# Patient Record
Sex: MALE | Race: BLACK OR AFRICAN AMERICAN | Employment: UNEMPLOYED | URBAN - METROPOLITAN AREA
[De-identification: names, ages, dates, MRNs, and addresses within clinical notes are randomized per-mention and may not be internally consistent; named-entity substitution may affect disease eponyms.]

---

## 2018-04-28 ENCOUNTER — HOSPITAL ENCOUNTER (EMERGENCY)
Age: 28
Discharge: HOME | End: 2018-04-29
Attending: EMERGENCY MEDICINE
Payer: SELF-PAY

## 2018-04-28 DIAGNOSIS — F41.9 ACUTE ANXIETY: Primary | ICD-10-CM

## 2018-04-28 PROCEDURE — 93005 ELECTROCARDIOGRAM TRACING: CPT | Performed by: EMERGENCY MEDICINE

## 2018-04-28 PROCEDURE — 99284 EMERGENCY DEPT VISIT MOD MDM: CPT | Performed by: EMERGENCY MEDICINE

## 2018-04-28 PROCEDURE — 74011250637 HC RX REV CODE- 250/637: Performed by: EMERGENCY MEDICINE

## 2018-04-28 RX ORDER — LORAZEPAM 1 MG/1
1 TABLET ORAL
Status: COMPLETED | OUTPATIENT
Start: 2018-04-28 | End: 2018-04-28

## 2018-04-28 RX ADMIN — LORAZEPAM 1 MG: 1 TABLET ORAL at 23:56

## 2018-04-29 VITALS
HEIGHT: 69 IN | WEIGHT: 142 LBS | RESPIRATION RATE: 16 BRPM | DIASTOLIC BLOOD PRESSURE: 70 MMHG | SYSTOLIC BLOOD PRESSURE: 110 MMHG | HEART RATE: 79 BPM | BODY MASS INDEX: 21.03 KG/M2 | OXYGEN SATURATION: 100 % | TEMPERATURE: 97.5 F

## 2018-04-29 LAB
ATRIAL RATE: 78 BPM
CALCULATED P AXIS, ECG09: 63 DEGREES
CALCULATED R AXIS, ECG10: 83 DEGREES
CALCULATED T AXIS, ECG11: 30 DEGREES
DIAGNOSIS, 93000: NORMAL
P-R INTERVAL, ECG05: 144 MS
Q-T INTERVAL, ECG07: 358 MS
QRS DURATION, ECG06: 84 MS
QTC CALCULATION (BEZET), ECG08: 408 MS
VENTRICULAR RATE, ECG03: 78 BPM

## 2018-04-29 RX ORDER — HYDROXYZINE PAMOATE 25 MG/1
25 CAPSULE ORAL
Qty: 30 CAP | Refills: 0 | Status: SHIPPED | OUTPATIENT
Start: 2018-04-29 | End: 2018-05-13

## 2018-04-29 NOTE — DISCHARGE INSTRUCTIONS

## 2018-04-29 NOTE — ED PROVIDER NOTES
Patient is a 25 y.o. male presenting with panic. The history is provided by the patient. Panic Attack    This is a new problem. The current episode started less than 1 hour ago. The problem has not changed since onset. Duration of episode(s) is 1 hour. The problem occurs constantly. The pain is associated with stress. The pain is present in the substernal region. The patient is experiencing no pain. The quality of the pain is described as tightness. The pain does not radiate. The symptoms are aggravated by stress. Associated symptoms include malaise/fatigue. Pertinent negatives include no abdominal pain, no back pain, no claudication, no cough, no diaphoresis, no dizziness, no exertional chest pressure, no fever, no headaches, no hemoptysis, no irregular heartbeat, no leg pain, no lower extremity edema, no nausea, no near-syncope, no numbness, no orthopnea, no palpitations, no PND, no shortness of breath, no sputum production, no vomiting and no weakness. He has tried rest for the symptoms. The treatment provided no relief. Risk factors include male gender. His past medical history does not include aneurysm, cancer, DM, DVT, HTN, PE or CHF. Past Medical History:   Diagnosis Date    Seizures ([facility] [address]     multiple seizures in 2011 only    Stroke [facility]) 2004       No past surgical history on file. No family history on file. Social History     Social History    Marital status: SINGLE     Spouse name: N/A    Number of children: N/A    Years of education: N/A     Occupational History    Not on file. Social History Main Topics    Smoking status: Never Smoker    Smokeless tobacco: Never Used    Alcohol use Yes      Comment: occasional    Drug use: No    Sexual activity: Not on file     Other Topics Concern    Not on file     Social History Narrative    No narrative on file         ALLERGIES: Review of patient's allergies indicates no known allergies.     Review of Systems   Constitutional: Positive for malaise/fatigue. Negative for diaphoresis and fever. Respiratory: Negative for cough, hemoptysis, sputum production and shortness of breath. Cardiovascular: Negative for palpitations, orthopnea, claudication, PND and near-syncope. Gastrointestinal: Negative for abdominal pain, nausea and vomiting. Musculoskeletal: Negative for back pain. Neurological: Negative for dizziness, weakness, numbness and headaches. All other systems reviewed and are negative. Vitals:    04/28/18 2301 04/29/18 0017 04/29/18 0024   BP: 128/87     Pulse: (!) 115  78   Resp: 24     Temp: 97.5 °F (36.4 °C)     SpO2: 100% 100%    Weight: 64.4 kg (142 lb)     Height: 5' 9\" (1.753 m)              Physical Exam   Constitutional: He is oriented to person, place, and time. He appears well-developed and well-nourished. He appears distressed. HENT:   Head: Normocephalic and atraumatic. Right Ear: Tympanic membrane and external ear normal.   Left Ear: Tympanic membrane and external ear normal.   Mouth/Throat: Oropharynx is clear and moist.   Eyes: Conjunctivae and EOM are normal. Pupils are equal, round, and reactive to light. Neck: Normal range of motion. Neck supple. No tracheal deviation present. Cardiovascular: Regular rhythm, normal heart sounds and intact distal pulses. Tachycardia present. Exam reveals no gallop and no friction rub. No murmur heard. Pulmonary/Chest: Effort normal and breath sounds normal. No respiratory distress. He has no wheezes. Abdominal: Soft. Bowel sounds are normal. He exhibits no distension and no mass. There is no hepatosplenomegaly. There is no tenderness. There is no rebound and no guarding. Musculoskeletal: Normal range of motion. He exhibits no edema. Lymphadenopathy:     He has no cervical adenopathy. Neurological: He is alert and oriented to person, place, and time. He displays normal reflexes. No cranial nerve deficit. Skin: Skin is warm and dry.  No rash noted. He is not diaphoretic. No erythema. Psychiatric: His speech is normal. His mood appears anxious. Nursing note and vitals reviewed. MDM  Number of Diagnoses or Management Options  Acute anxiety: new and requires workup     Amount and/or Complexity of Data Reviewed  Review and summarize past medical records: yes  Independent visualization of images, tracings, or specimens: yes    Risk of Complications, Morbidity, and/or Mortality  Presenting problems: low  Diagnostic procedures: minimal  Management options: low    Patient Progress  Patient progress: improved        ED Course       Procedures    The patient was observed in the ED. Feeling much improved times discharge. Results Reviewed:    EKG revealed a normal sinus rhythm with no acute ST or T-wave changes. I discussed the results of all labs, procedures, radiographs, and treatments with the patient and available family. Treatment plan is agreed upon and the patient is ready for discharge. All voiced understanding of the discharge plan and medication instructions or changes as appropriate. Questions about treatment in the ED were answered. All were encouraged to return should symptoms worsen or new problems develop.

## 2018-04-29 NOTE — ED NOTES
I have reviewed discharge instructions with the patient. The patient verbalized understanding. Patient left ED via Discharge Method: ambulatory to Home with ride from friendt). Opportunity for questions and clarification provided. Patient given 1 scripts. To continue your aftercare when you leave the hospital, you may receive an automated call from our care team to check in on how you are doing. This is a free service and part of our promise to provide the best care and service to meet your aftercare needs.  If you have questions, or wish to unsubscribe from this service please call [phone]. Thank you for Choosing our [facility] Emergency Department.

## 2018-07-21 ENCOUNTER — HOSPITAL ENCOUNTER (EMERGENCY)
Age: 28
Discharge: HOME | End: 2018-07-21
Attending: EMERGENCY MEDICINE
Payer: SELF-PAY

## 2018-07-21 VITALS
HEART RATE: 116 BPM | WEIGHT: 130 LBS | HEIGHT: 69 IN | OXYGEN SATURATION: 100 % | SYSTOLIC BLOOD PRESSURE: 125 MMHG | BODY MASS INDEX: 19.26 KG/M2 | TEMPERATURE: 98.3 F | RESPIRATION RATE: 18 BRPM | DIASTOLIC BLOOD PRESSURE: 92 MMHG

## 2018-07-21 DIAGNOSIS — F41.9 ACUTE ANXIETY: Primary | ICD-10-CM

## 2018-07-21 DIAGNOSIS — T75.4XXA ELECTRICAL SHOCK OF HAND, INITIAL ENCOUNTER: ICD-10-CM

## 2018-07-21 LAB
ATRIAL RATE: 90 BPM
CALCULATED P AXIS, ECG09: 80 DEGREES
CALCULATED R AXIS, ECG10: 91 DEGREES
CALCULATED T AXIS, ECG11: 5 DEGREES
DIAGNOSIS, 93000: NORMAL
P-R INTERVAL, ECG05: 130 MS
Q-T INTERVAL, ECG07: 326 MS
QRS DURATION, ECG06: 82 MS
QTC CALCULATION (BEZET), ECG08: 398 MS
VENTRICULAR RATE, ECG03: 90 BPM

## 2018-07-21 PROCEDURE — 93005 ELECTROCARDIOGRAM TRACING: CPT | Performed by: EMERGENCY MEDICINE

## 2018-07-21 PROCEDURE — 99283 EMERGENCY DEPT VISIT LOW MDM: CPT | Performed by: EMERGENCY MEDICINE

## 2018-07-21 PROCEDURE — 74011250637 HC RX REV CODE- 250/637: Performed by: EMERGENCY MEDICINE

## 2018-07-21 RX ORDER — CLONAZEPAM 0.5 MG/1
1 TABLET ORAL
Status: COMPLETED | OUTPATIENT
Start: 2018-07-21 | End: 2018-07-21

## 2018-07-21 RX ADMIN — CLONAZEPAM 1 MG: 0.5 TABLET ORAL at 01:39

## 2018-07-21 NOTE — ED NOTES
I have reviewed discharge instructions with the patient. The patient verbalized understanding. Patient left ED via Discharge Method: ambulatory to Home with friend. Opportunity for questions and clarification provided. Patient given 0 scripts. To continue your aftercare when you leave the hospital, you may receive an automated call from our care team to check in on how you are doing. This is a free service and part of our promise to provide the best care and service to meet your aftercare needs.  If you have questions, or wish to unsubscribe from this service please call [phone]. Thank you for Choosing our [facility] Emergency Department.

## 2018-07-21 NOTE — LETTER
ADULT WORK/SCHOOL NOTE [facility] 
[address] [facility] [address] 
 
 
07/21/18 To whom it may concern, Please be advised that [facility] was evaluated and discharged from the Emergency Department on 07/21/18. [facility] is excused from work/school now through 7/22/18 [facility] may return to work with the following restrictions: 
 
         None Should [facility] require more time off or further clearance, he should follow up with his  own regular physician or specialist. 
 
 
Sincerely, Eunice Campos RN

## 2018-07-21 NOTE — ED PROVIDER NOTES
HPI Comments: ppatient was fixing his doorbell and received a shock to both fingers and hands with  Electrical current shooting up into his arms. Patient extremely anxious. Complains of twitching and nervousness. There are no burns or wounds. Patient is a 25 y.o. male presenting with other event. The history is provided by the patient. Other   This is a new problem. The current episode started 1 to 2 hours ago. The problem occurs constantly. The problem has been gradually improving. Pertinent negatives include no chest pain and no shortness of breath. Past Medical History:   Diagnosis Date    Seizures ([address]     multiple seizures in 2011 only    Stroke [facility]) 2004       History reviewed. No pertinent surgical history. History reviewed. No pertinent family history. Social History     Social History    Marital status: SINGLE     Spouse name: N/A    Number of children: N/A    Years of education: N/A     Occupational History    Not on file. Social History Main Topics    Smoking status: Never Smoker    Smokeless tobacco: Never Used    Alcohol use Yes      Comment: occasional    Drug use: No    Sexual activity: Not on file     Other Topics Concern    Not on file     Social History Narrative         ALLERGIES: Review of patient's allergies indicates no known allergies. Review of Systems   Respiratory: Negative for shortness of breath. Cardiovascular: Negative for chest pain and palpitations. Neurological: Positive for tremors. Psychiatric/Behavioral: The patient is nervous/anxious. Vitals:    07/21/18 0116   BP: (!) 125/92   Pulse: (!) 116   Resp: 18   Temp: 98.3 °F (36.8 °C)   SpO2: 100%   Weight: 59 kg (130 lb)   Height: 5' 9\" (1.753 m)            Physical Exam   Constitutional: He appears well-developed and well-nourished. He appears distressed. Cardiovascular: Normal rate, regular rhythm and normal heart sounds.     Pulmonary/Chest: Effort normal and breath sounds normal.   Musculoskeletal: Normal range of motion. He exhibits no edema or tenderness. Neurological: He is alert. Skin: Skin is warm and dry. No rash noted. No erythema. No burns on fingers. Psychiatric: His speech is normal. His mood appears anxious. He is agitated. Nursing note and vitals reviewed. MDM  Number of Diagnoses or Management Options  Diagnosis management comments: No burns or wounds. Patient is extremely anxious. Anxiety will be treated. Patient has a history of anxiety. Do not believe any lab work is indicated or would .        Amount and/or Complexity of Data Reviewed  Clinical lab tests: ordered and reviewed (Results for orders placed or performed during the hospital encounter of 04/28/18  -EKG, 12 LEAD, INITIAL       Result                                            Value                         Ref Range                       Ventricular Rate                                  78                            BPM                             Atrial Rate                                       78                            BPM                             P-R Interval                                      144                           ms                              QRS Duration                                      84                            ms                              Q-T Interval                                      358                           ms                              QTC Calculation (Bezet)                           408                           ms                              Calculated P Axis                                 63                            degrees                         Calculated R Axis                                 83                            degrees                         Calculated T Axis                                 30                            degrees                         Diagnosis Normal sinus rhythm   Biatrial enlargement   Abnormal ECG   When compared with ECG of 29-APR-2018 00:01,   No significant change was found   Confirmed by MACI DRIVER (), KAREN FOX ([id number]) on 4/29/2018 11:16:50 AM     )          ED Course       Procedures

## 2018-07-21 NOTE — ED NOTES
Per MD Anastacia Concepcion EKG as precaution, no labs needed at current. Per MD, electrical current through doorball tied to resistors and should have been a minimal shock. As noted in triage note, patient denies LOC. Patient denies any SZ activity. Patient denies any CP at current. Patient very anxious at time of triage.

## 2018-07-21 NOTE — ED TRIAGE NOTES
States 15 minutes ago he shocked himself by trying to fix his doorbell. States his feels very jittery. States he did not lose consciousness.

## 2018-07-21 NOTE — DISCHARGE INSTRUCTIONS
Electrical Shock: Care Instructions  Your Care Instructions  You have had an electrical shock. This may have happened when you used an electrical appliance or power cord. Lightning and stun guns also can cause electrical shocks. The shock can cause a burn where the current enters and leaves your body. The electricity may have injured blood vessels, nerves, and muscles. The electricity also could have affected your heart and lungs. You might not see all the damage the shock caused for up to 10 days after the shock. Your doctor will tell you what to look for depending on the type of shock you got. Follow-up care is a key part of your treatment and safety. Be sure to make and go to all appointments, and call your doctor if you are having problems. It's also a good idea to know your test results and keep a list of the medicines you take. How can you care for yourself at home? If you have a mild burn:  · Clean the area each day with mild soap and water. · Bandage the wound. ¨ If the doctor told you to use an ointment under the bandage, use it exactly as directed. ¨ Cover the burn with a nonstick gauze pad. ¨ Tape the pad to your skin, well away from the burn, or follow other dressing instructions, as your doctor advises. ¨ Do not wrap tape all the way around a hand, arm, or leg. This can cause swelling. ¨ Keep the bandage clean and dry. Change the bandage 2 times a day and anytime it gets wet. · Do not break blisters open. This increases the chance of infection. If a blister breaks open by itself, blot up the liquid, and leave the skin that covered the blister. This helps protect the new skin. For pain and itching:  · Take an over-the-counter pain medicine, such as acetaminophen (Tylenol), ibuprofen (Advil, Motrin), or naproxen (Aleve). Read and follow all instructions on the label. Do not use aspirin, because it can make bleeding in the burned area worse.   · Do not take two or more pain medicines at the same time unless the doctor told you to. Many pain medicines have acetaminophen, which is Tylenol. Too much acetaminophen (Tylenol) can be harmful. · If the burn itches, do not scratch it. Try an over-the-counter antihistamine, such as diphenhydramine (Benadryl) or loratadine (Claritin). Read and follow all instructions on the label. Preventing electrical shocks at home  · Place plug covers on all outlets. · Use extra caution when using electrical items in areas where water sources are nearby, such as using a hair dryer in the bathroom. · Do not overload electrical outlets by using too many extension cords or electrical receptacle multipliers. · Replace electrical equipment and appliances that show signs of wear, such as having frayed or loose wires. When should you call for help? Call 911 anytime you think you may need emergency care. For example, call if:    · You passed out (lost consciousness).    Call your doctor now or seek immediate medical care if:    · You have symptoms of infection, such as:  ¨ Increased pain, swelling, warmth, or redness. ¨ Red streaks leading from the area. ¨ Pus draining from the area. ¨ A fever.     · You have a hard time breathing.     · You have new or worse pain in the burn area.     · Your urine looks pink or brown.     · Your muscles ache or feel weak.    Watch closely for changes in your health, and be sure to contact your doctor if you have any problems. Where can you learn more? Go to http://aureliano-kael.info/. Enter 33 17 13 in the search box to learn more about \"Electrical Shock: Care Instructions. \"  Current as of: November 20, 2017  Content Version: 11.7  © 1874-2138 [employer]. Care instructions adapted under license by [employer] (which disclaims liability or warranty for this information).  If you have questions about a medical condition or this instruction, always ask your healthcare professional. Ana L[facility] disclaims any warranty or liability for your use of this information. Anxiety Disorder: Care Instructions  Your Care Instructions    Anxiety is a normal reaction to stress. Difficult situations can cause you to have symptoms such as sweaty palms and a nervous feeling. In an anxiety disorder, the symptoms are far more severe. Constant worry, muscle tension, trouble sleeping, nausea and diarrhea, and other symptoms can make normal daily activities difficult or impossible. These symptoms may occur for no reason, and they can affect your work, school, or social life. Medicines, counseling, and self-care can all help. Follow-up care is a key part of your treatment and safety. Be sure to make and go to all appointments, and call your doctor if you are having problems. It's also a good idea to know your test results and keep a list of the medicines you take. How can you care for yourself at home? · Take medicines exactly as directed. Call your doctor if you think you are having a problem with your medicine. · Go to your counseling sessions and follow-up appointments. · Recognize and accept your anxiety. Then, when you are in a situation that makes you anxious, say to yourself, \"This is not an emergency. I feel uncomfortable, but I am not in danger. I can keep going even if I feel anxious. \"  · Be kind to your body:  ¨ Relieve tension with exercise or a massage. ¨ Get enough rest.  ¨ Avoid alcohol, caffeine, nicotine, and illegal drugs. They can increase your anxiety level and cause sleep problems. ¨ Learn and do relaxation techniques. See below for more about these techniques. · Engage your mind. Get out and do something you enjoy. Go to a funny movie, or take a walk or hike. Plan your day. Having too much or too little to do can make you anxious. · Keep a record of your symptoms. Discuss your fears with a good friend or family member, or join a support group for people with similar problems. Talking to others sometimes relieves stress. · Get involved in social groups, or volunteer to help others. Being alone sometimes makes things seem worse than they are. · Get at least 30 minutes of exercise on most days of the week to relieve stress. Walking is a good choice. You also may want to do other activities, such as running, swimming, cycling, or playing tennis or team sports. Relaxation techniques  Do relaxation exercises 10 to 20 minutes a day. You can play soothing, relaxing music while you do them, if you wish. · Tell others in your house that you are going to do your relaxation exercises. Ask them not to disturb you. · Find a comfortable place, away from all distractions and noise. · Lie down on your back, or sit with your back straight. · Focus on your breathing. Make it slow and steady. · Breathe in through your nose. Breathe out through either your nose or mouth. · Breathe deeply, filling up the area between your navel and your rib cage. Breathe so that your belly goes up and down. · Do not hold your breath. · Breathe like this for 5 to 10 minutes. Notice the feeling of calmness throughout your whole body. As you continue to breathe slowly and deeply, relax by doing the following for another 5 to 10 minutes:  · Tighten and relax each muscle group in your body. You can begin at your toes and work your way up to your head. · Imagine your muscle groups relaxing and becoming heavy. · Empty your mind of all thoughts. · Let yourself relax more and more deeply. · Become aware of the state of calmness that surrounds you. · When your relaxation time is over, you can bring yourself back to alertness by moving your fingers and toes and then your hands and feet and then stretching and moving your entire body. Sometimes people fall asleep during relaxation, but they usually wake up shortly afterward.   · Always give yourself time to return to full alertness before you drive a car or do anything that might cause an accident if you are not fully alert. Never play a relaxation tape while you drive a car. When should you call for help? Call 911 anytime you think you may need emergency care. For example, call if:    · You feel you cannot stop from hurting yourself or someone else.   Radha Lyle the numbers for these national suicide hotlines: 5-744-891-TALK (3-196.903.6039) and 4-348-LMDIZUI (7-821.516.8853). If you or someone you know talks about suicide or feeling hopeless, get help right away.   Watch closely for changes in your health, and be sure to contact your doctor if:    · You have anxiety or fear that affects your life.     · You have symptoms of anxiety that are new or different from those you had before. Where can you learn more? Go to http://aureliano-kael.info/. Enter P754 in the search box to learn more about \"Anxiety Disorder: Care Instructions. \"  Current as of: December 7, 2017  Content Version: 11.7  © 5650-8068 [employer]. Care instructions adapted under license by [employer] (which disclaims liability or warranty for this information). If you have questions about a medical condition or this instruction, always ask your healthcare professional. [address] any warranty or liability for your use of this information.

## 2019-03-26 ENCOUNTER — HOSPITAL ENCOUNTER (EMERGENCY)
Age: 29
Discharge: HOME | End: 2019-03-26
Attending: EMERGENCY MEDICINE
Payer: SELF-PAY

## 2019-03-26 VITALS
HEART RATE: 68 BPM | SYSTOLIC BLOOD PRESSURE: 117 MMHG | DIASTOLIC BLOOD PRESSURE: 76 MMHG | TEMPERATURE: 97.9 F | OXYGEN SATURATION: 99 % | RESPIRATION RATE: 16 BRPM

## 2019-03-26 DIAGNOSIS — R51.9 NONINTRACTABLE HEADACHE, UNSPECIFIED CHRONICITY PATTERN, UNSPECIFIED HEADACHE TYPE: Primary | ICD-10-CM

## 2019-03-26 DIAGNOSIS — G40.909 SEIZURE DISORDER (HCC): ICD-10-CM

## 2019-03-26 PROCEDURE — 99282 EMERGENCY DEPT VISIT SF MDM: CPT | Performed by: PHYSICIAN ASSISTANT

## 2019-03-26 RX ORDER — LEVETIRACETAM 500 MG/1
500 TABLET ORAL 2 TIMES DAILY
Qty: 60 TAB | Refills: 1 | Status: SHIPPED | OUTPATIENT
Start: 2019-03-26 | End: 2019-11-23 | Stop reason: SDUPTHER

## 2019-03-26 NOTE — ED TRIAGE NOTES
Pt complains of lump to right posterior head. States he is having headaches at night. States no headache now. Denies recent trauma.

## 2019-03-26 NOTE — ED NOTES
I have reviewed discharge instructions with the patient. The patient verbalized understanding. Patient left ED via Discharge Method: ambulatory to Home with (insert name of family/friend, self, transportself). Opportunity for questions and clarification provided. Patient given 1 scripts. To continue your aftercare when you leave the hospital, you may receive an automated call from our care team to check in on how you are doing. This is a free service and part of our promise to provide the best care and service to meet your aftercare needs.  If you have questions, or wish to unsubscribe from this service please call [phone]. Thank you for Choosing our [facility] Emergency Department.

## 2019-03-26 NOTE — ED PROVIDER NOTES
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 Pt states he has sz on meds now, no current pmd but had insurance through his job The history is provided by the patient. Headache This is a recurrent problem. The current episode started more than 1 week ago. Episode frequency: every few days. The problem has not changed since onset. The headache is aggravated by an unknown factor. The pain is located in the generalized region. The quality of the pain is described as dull. The pain is at a severity of 3/10. The pain is mild. Pertinent negatives include no syncope, no dizziness, no visual change and no nausea. He has tried nothing for the symptoms. The treatment provided no relief. Past Medical History:  
Diagnosis Date  Seizures ([facility] [address] multiple seizures in 2011 only  Stroke [facility]) 2004 No past surgical history on file. No family history on file. Social History Socioeconomic History  Marital status: SINGLE Spouse name: Not on file  Number of children: Not on file  Years of education: Not on file  Highest education level: Not on file Occupational History  Not on file Social Needs  Financial resource strain: Not on file  Food insecurity:  
  Worry: Not on file Inability: Not on file  Transportation needs:  
  Medical: Not on file Non-medical: Not on file Tobacco Use  Smoking status: Never Smoker  Smokeless tobacco: Never Used Substance and Sexual Activity  Alcohol use: Yes Comment: occasional  
 Drug use: No  
 Sexual activity: Not on file Lifestyle  Physical activity:  
  Days per week: Not on file Minutes per session: Not on file  Stress: Not on file Relationships  Social connections:  
  Talks on phone: Not on file Gets together: Not on file Attends Anglican service: Not on file Active member of club or organization: Not on file Attends meetings of clubs or organizations: Not on file Relationship status: Not on file  Intimate partner violence:  
  Fear of current or ex partner: Not on file Emotionally abused: Not on file Physically abused: Not on file Forced sexual activity: Not on file Other Topics Concern  Not on file Social History Narrative  Not on file ALLERGIES: Patient has no known allergies. Review of Systems Cardiovascular: Negative for syncope. Gastrointestinal: Negative for nausea. Neurological: Positive for headaches. Negative for dizziness. All other systems reviewed and are negative. Vitals:  
 03/26/19 1332 BP: 117/76 Pulse: 68 Resp: 16 Temp: 97.9 °F (36.6 °C) SpO2: 99% Physical Exam  
Constitutional: He is oriented to person, place, and time. He appears well-developed and well-nourished. No distress. HENT:  
Head: Normocephalic and atraumatic. Eyes: Pupils are equal, round, and reactive to light. EOM are normal.  
Neck: Normal range of motion. Neck supple. Cardiovascular: Normal rate and regular rhythm. Pulmonary/Chest: Effort normal and breath sounds normal.  
Abdominal: Soft. Bowel sounds are normal.  
Musculoskeletal: Normal range of motion. Lymphadenopathy:  
  He has no cervical adenopathy. Neurological: He is alert and oriented to person, place, and time. No cranial nerve deficit. Coordination normal.  
Skin: Skin is warm. He is not diaphoretic. Psychiatric: He has a normal mood and affect. Nursing note and vitals reviewed. MDM Number of Diagnoses or Management Options Diagnosis management comments: No curretn positive phyical findings today, on review of chart pt had head ct done at [facility] AT [facility] 3[facility], no abnormal findings, + h/o sz and was on keppra Will given rx for keppra Stressed need for pmd, options given Amount and/or Complexity of Data Reviewed Review and summarize past medical records: yes Risk of Complications, Morbidity, and/or Mortality Presenting problems: low Diagnostic procedures: low Management options: low Patient Progress Patient progress: improved Procedures

## 2019-11-23 ENCOUNTER — HOSPITAL ENCOUNTER (EMERGENCY)
Age: 29
Discharge: HOME | End: 2019-11-23
Attending: EMERGENCY MEDICINE
Payer: SELF-PAY

## 2019-11-23 ENCOUNTER — APPOINTMENT (OUTPATIENT)
Dept: GENERAL RADIOLOGY | Age: 29
End: 2019-11-23
Attending: EMERGENCY MEDICINE
Payer: SELF-PAY

## 2019-11-23 VITALS
DIASTOLIC BLOOD PRESSURE: 76 MMHG | HEIGHT: 69 IN | WEIGHT: 130 LBS | SYSTOLIC BLOOD PRESSURE: 113 MMHG | BODY MASS INDEX: 19.26 KG/M2 | OXYGEN SATURATION: 98 % | RESPIRATION RATE: 25 BRPM | TEMPERATURE: 98.1 F | HEART RATE: 74 BPM

## 2019-11-23 DIAGNOSIS — G40.909 SEIZURE DISORDER (HCC): Primary | ICD-10-CM

## 2019-11-23 DIAGNOSIS — Z91.14 NONCOMPLIANCE WITH MEDICATIONS: ICD-10-CM

## 2019-11-23 LAB
ALBUMIN SERPL-MCNC: 3.7 G/DL (ref 3.5–5)
ALBUMIN/GLOB SERPL: 0.8 {RATIO} (ref 1.2–3.5)
ALP SERPL-CCNC: 40 U/L (ref 50–136)
ALT SERPL-CCNC: 29 U/L (ref 12–65)
AMPHET UR QL SCN: NEGATIVE
ANION GAP SERPL CALC-SCNC: 7 MMOL/L (ref 7–16)
AST SERPL-CCNC: 29 U/L (ref 15–37)
ATRIAL RATE: 129 BPM
BARBITURATES UR QL SCN: NEGATIVE
BASOPHILS # BLD: 0 K/UL (ref 0–0.2)
BASOPHILS NFR BLD: 1 % (ref 0–2)
BENZODIAZ UR QL: NEGATIVE
BILIRUB SERPL-MCNC: 0.4 MG/DL (ref 0.2–1.1)
BUN SERPL-MCNC: 11 MG/DL (ref 6–23)
CALCIUM SERPL-MCNC: 8.8 MG/DL (ref 8.3–10.4)
CALCULATED P AXIS, ECG09: 68 DEGREES
CALCULATED R AXIS, ECG10: 81 DEGREES
CALCULATED T AXIS, ECG11: -28 DEGREES
CANNABINOIDS UR QL SCN: POSITIVE
CHLORIDE SERPL-SCNC: 104 MMOL/L (ref 98–107)
CK SERPL-CCNC: 238 U/L (ref 21–215)
CO2 SERPL-SCNC: 27 MMOL/L (ref 21–32)
COCAINE UR QL SCN: NEGATIVE
CREAT SERPL-MCNC: 1.32 MG/DL (ref 0.8–1.5)
DIAGNOSIS, 93000: NORMAL
DIFFERENTIAL METHOD BLD: ABNORMAL
EOSINOPHIL # BLD: 0.1 K/UL (ref 0–0.8)
EOSINOPHIL NFR BLD: 1 % (ref 0.5–7.8)
ERYTHROCYTE [DISTWIDTH] IN BLOOD BY AUTOMATED COUNT: 12.9 % (ref 11.9–14.6)
ETHANOL SERPL-MCNC: 10 MG/DL
GLOBULIN SER CALC-MCNC: 4.4 G/DL (ref 2.3–3.5)
GLUCOSE SERPL-MCNC: 130 MG/DL (ref 65–100)
HCT VFR BLD AUTO: 48.8 % (ref 41.1–50.3)
HGB BLD-MCNC: 16 G/DL (ref 13.6–17.2)
IMM GRANULOCYTES # BLD AUTO: 0 K/UL (ref 0–0.5)
IMM GRANULOCYTES NFR BLD AUTO: 1 % (ref 0–5)
LYMPHOCYTES # BLD: 2.4 K/UL (ref 0.5–4.6)
LYMPHOCYTES NFR BLD: 41 % (ref 13–44)
MCH RBC QN AUTO: 28.4 PG (ref 26.1–32.9)
MCHC RBC AUTO-ENTMCNC: 32.8 G/DL (ref 31.4–35)
MCV RBC AUTO: 86.5 FL (ref 79.6–97.8)
METHADONE UR QL: NEGATIVE
MONOCYTES # BLD: 0.3 K/UL (ref 0.1–1.3)
MONOCYTES NFR BLD: 6 % (ref 4–12)
NEUTS SEG # BLD: 3 K/UL (ref 1.7–8.2)
NEUTS SEG NFR BLD: 51 % (ref 43–78)
NRBC # BLD: 0 K/UL (ref 0–0.2)
OPIATES UR QL: NEGATIVE
P-R INTERVAL, ECG05: 140 MS
PCP UR QL: NEGATIVE
PLATELET # BLD AUTO: 277 K/UL (ref 150–450)
PMV BLD AUTO: 10.2 FL (ref 9.4–12.3)
POTASSIUM SERPL-SCNC: 4.3 MMOL/L (ref 3.5–5.1)
PROT SERPL-MCNC: 8.1 G/DL (ref 6.3–8.2)
Q-T INTERVAL, ECG07: 278 MS
QRS DURATION, ECG06: 72 MS
QTC CALCULATION (BEZET), ECG08: 407 MS
RBC # BLD AUTO: 5.64 M/UL (ref 4.23–5.6)
SODIUM SERPL-SCNC: 138 MMOL/L (ref 136–145)
VENTRICULAR RATE, ECG03: 129 BPM
WBC # BLD AUTO: 5.8 K/UL (ref 4.3–11.1)

## 2019-11-23 PROCEDURE — 96374 THER/PROPH/DIAG INJ IV PUSH: CPT | Performed by: EMERGENCY MEDICINE

## 2019-11-23 PROCEDURE — 80307 DRUG TEST PRSMV CHEM ANLYZR: CPT

## 2019-11-23 PROCEDURE — 96361 HYDRATE IV INFUSION ADD-ON: CPT | Performed by: EMERGENCY MEDICINE

## 2019-11-23 PROCEDURE — 80053 COMPREHEN METABOLIC PANEL: CPT

## 2019-11-23 PROCEDURE — 99285 EMERGENCY DEPT VISIT HI MDM: CPT | Performed by: EMERGENCY MEDICINE

## 2019-11-23 PROCEDURE — 93005 ELECTROCARDIOGRAM TRACING: CPT | Performed by: EMERGENCY MEDICINE

## 2019-11-23 PROCEDURE — 74011250636 HC RX REV CODE- 250/636: Performed by: EMERGENCY MEDICINE

## 2019-11-23 PROCEDURE — 74011000258 HC RX REV CODE- 258: Performed by: EMERGENCY MEDICINE

## 2019-11-23 PROCEDURE — 82550 ASSAY OF CK (CPK): CPT

## 2019-11-23 PROCEDURE — 96375 TX/PRO/DX INJ NEW DRUG ADDON: CPT | Performed by: EMERGENCY MEDICINE

## 2019-11-23 PROCEDURE — 71045 X-RAY EXAM CHEST 1 VIEW: CPT

## 2019-11-23 PROCEDURE — 85025 COMPLETE CBC W/AUTO DIFF WBC: CPT

## 2019-11-23 RX ORDER — LORAZEPAM 2 MG/ML
1 INJECTION INTRAMUSCULAR
Status: COMPLETED | OUTPATIENT
Start: 2019-11-23 | End: 2019-11-23

## 2019-11-23 RX ORDER — LEVETIRACETAM 750 MG/1
500 TABLET ORAL 2 TIMES DAILY
Qty: 60 TAB | Refills: 0 | Status: SHIPPED | OUTPATIENT
Start: 2019-11-23 | End: 2019-12-23

## 2019-11-23 RX ADMIN — LORAZEPAM 1 MG: 2 INJECTION INTRAMUSCULAR; INTRAVENOUS at 06:41

## 2019-11-23 RX ADMIN — SODIUM CHLORIDE 1000 MG: 900 INJECTION, SOLUTION INTRAVENOUS at 10:08

## 2019-11-23 RX ADMIN — SODIUM CHLORIDE 1000 ML: 900 INJECTION, SOLUTION INTRAVENOUS at 06:44

## 2019-11-23 NOTE — ED NOTES
Patient resting in bed with female  at bedside. Patient states that he \"wants no medication. I want the medication inside me out. \" Patient reports headache and body aches. Requesting water at this time. Patient told the MD will have to approve. Reminded patient that he was fluids running and was given a urinal to provide a urine sample.
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
## 2019-11-23 NOTE — DISCHARGE INSTRUCTIONS
Never use Ultram/tramadol again! Start taking the Keppra daily. Absolutely no driving for 6 months or until cleared by neurology. Follow-up with neurology outpatient as soon as possible.

## 2019-11-23 NOTE — ED NOTES
I have reviewed discharge instructions with the patient. The patient verbalized understanding. Patient left ED via Discharge Method: wheelchair to home with (female ). Opportunity for questions and clarification provided. Patient given 1 scripts. No e-sign      To continue your aftercare when you leave the hospital, you may receive an automated call from our care team to check in on how you are doing. This is a free service and part of our promise to provide the best care and service to meet your aftercare needs.  If you have questions, or wish to unsubscribe from this service please call [phone]. Thank you for Choosing our [address] [place] [address] Emergency Department.

## 2019-11-23 NOTE — ED TRIAGE NOTES
Pt coming from his friends house. Friend states he started shaking and quit talking. States he took 750 of his keppra and felt like he was choking on it and his friend stuck her finger in his mouth to try and get him to throw up and he got some relief. Hx of seizures. States the shaking lasted a few minutes. Pt states he felt like he had 2 or 3. ETOH on board. Denies nausea or vomiting.

## 2020-01-12 ENCOUNTER — HOSPITAL ENCOUNTER (EMERGENCY)
Age: 30
Discharge: HOME | End: 2020-01-12
Attending: EMERGENCY MEDICINE
Payer: MEDICAID

## 2020-01-12 VITALS
BODY MASS INDEX: 18.49 KG/M2 | WEIGHT: 122 LBS | RESPIRATION RATE: 16 BRPM | DIASTOLIC BLOOD PRESSURE: 62 MMHG | HEART RATE: 69 BPM | SYSTOLIC BLOOD PRESSURE: 104 MMHG | HEIGHT: 68 IN | OXYGEN SATURATION: 100 % | TEMPERATURE: 98 F

## 2020-01-12 DIAGNOSIS — R56.9 SEIZURE (HCC): Primary | ICD-10-CM

## 2020-01-12 LAB
AMPHET UR QL SCN: NEGATIVE
BARBITURATES UR QL SCN: NEGATIVE
BENZODIAZ UR QL: NEGATIVE
CANNABINOIDS UR QL SCN: POSITIVE
COCAINE UR QL SCN: NEGATIVE
GLUCOSE BLD STRIP.AUTO-MCNC: 110 MG/DL (ref 65–100)
METHADONE UR QL: NEGATIVE
OPIATES UR QL: NEGATIVE
PCP UR QL: NEGATIVE

## 2020-01-12 PROCEDURE — 80307 DRUG TEST PRSMV CHEM ANLYZR: CPT

## 2020-01-12 PROCEDURE — 74011250636 HC RX REV CODE- 250/636: Performed by: EMERGENCY MEDICINE

## 2020-01-12 PROCEDURE — 82962 GLUCOSE BLOOD TEST: CPT

## 2020-01-12 PROCEDURE — 81003 URINALYSIS AUTO W/O SCOPE: CPT | Performed by: EMERGENCY MEDICINE

## 2020-01-12 PROCEDURE — 74011000258 HC RX REV CODE- 258: Performed by: EMERGENCY MEDICINE

## 2020-01-12 PROCEDURE — 96375 TX/PRO/DX INJ NEW DRUG ADDON: CPT | Performed by: EMERGENCY MEDICINE

## 2020-01-12 PROCEDURE — 99284 EMERGENCY DEPT VISIT MOD MDM: CPT | Performed by: EMERGENCY MEDICINE

## 2020-01-12 PROCEDURE — 96365 THER/PROPH/DIAG IV INF INIT: CPT | Performed by: EMERGENCY MEDICINE

## 2020-01-12 RX ORDER — KETOROLAC TROMETHAMINE 30 MG/ML
15 INJECTION, SOLUTION INTRAMUSCULAR; INTRAVENOUS
Status: COMPLETED | OUTPATIENT
Start: 2020-01-12 | End: 2020-01-12

## 2020-01-12 RX ORDER — LEVETIRACETAM 750 MG/1
750 TABLET ORAL 2 TIMES DAILY
COMMUNITY
End: 2021-02-04

## 2020-01-12 RX ADMIN — KETOROLAC TROMETHAMINE 15 MG: 30 INJECTION, SOLUTION INTRAMUSCULAR at 12:35

## 2020-01-12 RX ADMIN — LEVETIRACETAM 1000 MG: 100 INJECTION, SOLUTION INTRAVENOUS at 12:46

## 2020-01-12 NOTE — ED NOTES
I have reviewed discharge instructions with the patient. The patient verbalized understanding. Patient left ED via Discharge Method: ambulatory to Home with self. Opportunity for questions and clarification provided. Patient given 0 scripts. To continue your aftercare when you leave the hospital, you may receive an automated call from our care team to check in on how you are doing. This is a free service and part of our promise to provide the best care and service to meet your aftercare needs.  If you have questions, or wish to unsubscribe from this service please call [phone]. Thank you for Choosing our [facility] Emergency Department.
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
## 2020-01-12 NOTE — ED PROVIDER NOTES
64-year-old male with a history of epilepsy and chronic back pain presents with a possible seizure in his sleep last night. He reports having them a least 4 times a week, and they usually occur in his sleep. He states his girlfriend told him he was shaking. He came because his urine is dark and was told every time his urine is dark he must of had a seizure. He denies any loss of bladder control or biting his tongue. No injuries. Did not fall out of bed. He has been compliant with Keppra, but did not take a dose this morning. Denies drugs. Also has chronic back pain since June. He has been seen multiple times for this and has been evaluated by neuro interventional.  He is scheduled for a lumbar MRI, follow-up with neurosurgery, and follow-up with neurology. Seizure    Pertinent negatives include no confusion, no headaches, no visual disturbance, no chest pain, no cough, no nausea, no vomiting and no diarrhea. He reports no chest pain, no confusion, no visual disturbance, no diarrhea, no vomiting, no headaches, no cough. Back Pain    Pertinent negatives include no chest pain, no fever, no headaches, no abdominal pain, no dysuria and no weakness. Past Medical History:   Diagnosis Date    Seizures ([facility] [address]     multiple seizures in 2011 only    Stroke [facility]) 2004       History reviewed. No pertinent surgical history. History reviewed. No pertinent family history.     Social History     Socioeconomic History    Marital status: SINGLE     Spouse name: Not on file    Number of children: Not on file    Years of education: Not on file    Highest education level: Not on file   Occupational History    Not on file   Social Needs    Financial resource strain: Not on file    Food insecurity:     Worry: Not on file     Inability: Not on file    Transportation needs:     Medical: Not on file     Non-medical: Not on file   Tobacco Use    Smoking status: Never Smoker    Smokeless tobacco: Never Used   Substance and Sexual Activity    Alcohol use: Yes     Comment: occasional    Drug use: No    Sexual activity: Not on file   Lifestyle    Physical activity:     Days per week: Not on file     Minutes per session: Not on file    Stress: Not on file   Relationships    Social connections:     Talks on phone: Not on file     Gets together: Not on file     Attends Voodoo service: Not on file     Active member of club or organization: Not on file     Attends meetings of clubs or organizations: Not on file     Relationship status: Not on file    Intimate partner violence:     Fear of current or ex partner: Not on file     Emotionally abused: Not on file     Physically abused: Not on file     Forced sexual activity: Not on file   Other Topics Concern    Not on file   Social History Narrative    Not on file         ALLERGIES: Patient has no known allergies. Review of Systems   Constitutional: Negative for chills and fever. HENT: Negative for hearing loss. Eyes: Negative for visual disturbance. Respiratory: Negative for cough and shortness of breath. Cardiovascular: Negative for chest pain and palpitations. Gastrointestinal: Negative for abdominal pain, diarrhea, nausea and vomiting. Genitourinary: Negative for dysuria. Musculoskeletal: Positive for back pain. Skin: Negative for rash. Neurological: Positive for seizures. Negative for weakness and headaches. Psychiatric/Behavioral: Negative for confusion. Vitals:    01/12/20 1207   BP: 96/62   Pulse: 77   Resp: 16   Temp: 98 °F (36.7 °C)   SpO2: 96%   Weight: 55.3 kg (122 lb)   Height: 5' 8\" (1.727 m)            Physical Exam  Vitals signs and nursing note reviewed. Constitutional:       Appearance: He is well-developed. HENT:      Head: Normocephalic and atraumatic. Eyes:      Pupils: Pupils are equal, round, and reactive to light. Neck:      Musculoskeletal: Normal range of motion and neck supple.    Cardiovascular: Rate and Rhythm: Regular rhythm. Heart sounds: Normal heart sounds. Pulmonary:      Effort: Pulmonary effort is normal.      Breath sounds: Normal breath sounds. Abdominal:      Palpations: Abdomen is soft. Tenderness: There is no tenderness. Musculoskeletal: Normal range of motion. Skin:     General: Skin is warm and dry. Neurological:      Mental Status: He is alert. Psychiatric:         Behavior: Behavior normal.          MDM  Number of Diagnoses or Management Options  Diagnosis management comments: Parts of this document were created using dragon voice recognition software. The chart has been reviewed but errors may still be present. Very well-appearing. He has multiple recent visits to the emergency department for seizures. Discussed he did not need to present to the emergency department every time he had a seizure, only if there is any associated concern for infection or injury. He expressed understanding. Will load with Keppra today and check urine. I discussed the results of all labs, procedures, radiographs, and treatments with the patient and available family. Treatment plan is agreed upon and the patient is ready for discharge. Questions about treatment in the ED and differential diagnosis of presenting condition were answered. Patient was given verbal discharge instructions including, but not limited to, importance of returning to the emergency department for any concern of worsening or continued symptoms. Instructions were given to follow up with a primary care provider or specialist within 1-2 days. Adverse effects of medications, if prescribed, were discussed and patient was advised to refrain from significant physical activity until followed up by primary care physician and to not drive or operate heavy machinery after taking any sedating substances.            Amount and/or Complexity of Data Reviewed  Clinical lab tests: ordered and reviewed  Tests in the medicine section of CPT®: reviewed and ordered           Procedures

## 2020-02-25 ENCOUNTER — HOSPITAL ENCOUNTER (EMERGENCY)
Age: 30
Discharge: HOME | End: 2020-02-25
Attending: EMERGENCY MEDICINE
Payer: MEDICAID

## 2020-02-25 VITALS
DIASTOLIC BLOOD PRESSURE: 61 MMHG | SYSTOLIC BLOOD PRESSURE: 101 MMHG | HEART RATE: 59 BPM | HEIGHT: 68 IN | OXYGEN SATURATION: 97 % | RESPIRATION RATE: 20 BRPM | TEMPERATURE: 97.8 F | WEIGHT: 120 LBS | BODY MASS INDEX: 18.19 KG/M2

## 2020-02-25 DIAGNOSIS — R51.9 NONINTRACTABLE EPISODIC HEADACHE, UNSPECIFIED HEADACHE TYPE: Primary | ICD-10-CM

## 2020-02-25 DIAGNOSIS — G40.909 SEIZURE DISORDER (HCC): ICD-10-CM

## 2020-02-25 PROCEDURE — 99284 EMERGENCY DEPT VISIT MOD MDM: CPT

## 2020-02-25 PROCEDURE — 96372 THER/PROPH/DIAG INJ SC/IM: CPT

## 2020-02-25 PROCEDURE — 74011250636 HC RX REV CODE- 250/636: Performed by: EMERGENCY MEDICINE

## 2020-02-25 RX ORDER — HYDROMORPHONE HYDROCHLORIDE 1 MG/ML
0.5 INJECTION, SOLUTION INTRAMUSCULAR; INTRAVENOUS; SUBCUTANEOUS
Status: COMPLETED | OUTPATIENT
Start: 2020-02-25 | End: 2020-02-25

## 2020-02-25 RX ORDER — BUTALBITAL, ACETAMINOPHEN AND CAFFEINE 300; 40; 50 MG/1; MG/1; MG/1
1 CAPSULE ORAL
Qty: 15 CAP | Refills: 0 | Status: SHIPPED | OUTPATIENT
Start: 2020-02-25 | End: 2021-02-04

## 2020-02-25 RX ORDER — PROMETHAZINE HYDROCHLORIDE 25 MG/ML
25 INJECTION, SOLUTION INTRAMUSCULAR; INTRAVENOUS
Status: COMPLETED | OUTPATIENT
Start: 2020-02-25 | End: 2020-02-25

## 2020-02-25 RX ADMIN — HYDROMORPHONE HYDROCHLORIDE 0.5 MG: 1 INJECTION, SOLUTION INTRAMUSCULAR; INTRAVENOUS; SUBCUTANEOUS at 21:23

## 2020-02-25 RX ADMIN — PROMETHAZINE HYDROCHLORIDE 25 MG: 25 INJECTION INTRAMUSCULAR; INTRAVENOUS at 21:23

## 2020-02-26 NOTE — DISCHARGE INSTRUCTIONS
Patient Education        Headache: Care Instructions  Your Care Instructions    Headaches have many possible causes. Most headaches aren't a sign of a more serious problem, and they will get better on their own. Home treatment may help you feel better faster. The doctor has checked you carefully, but problems can develop later. If you notice any problems or new symptoms, get medical treatment right away. Follow-up care is a key part of your treatment and safety. Be sure to make and go to all appointments, and call your doctor if you are having problems. It's also a good idea to know your test results and keep a list of the medicines you take. How can you care for yourself at home? · Do not drive if you have taken a prescription pain medicine. · Rest in a quiet, dark room until your headache is gone. Close your eyes and try to relax or go to sleep. Don't watch TV or read. · Put a cold, moist cloth or cold pack on the painful area for 10 to 20 minutes at a time. Put a thin cloth between the cold pack and your skin. · Use a warm, moist towel or a heating pad set on low to relax tight shoulder and neck muscles. · Have someone gently massage your neck and shoulders. · Take pain medicines exactly as directed. ? If the doctor gave you a prescription medicine for pain, take it as prescribed. ? If you are not taking a prescription pain medicine, ask your doctor if you can take an over-the-counter medicine. · Be careful not to take pain medicine more often than the instructions allow, because you may get worse or more frequent headaches when the medicine wears off. · Do not ignore new symptoms that occur with a headache, such as a fever, weakness or numbness, vision changes, or confusion. These may be signs of a more serious problem. To prevent headaches  · Keep a headache diary so you can figure out what triggers your headaches. Avoiding triggers may help you prevent headaches.  Record when each headache began, how long it lasted, and what the pain was like (throbbing, aching, stabbing, or dull). Write down any other symptoms you had with the headache, such as nausea, flashing lights or dark spots, or sensitivity to bright light or loud noise. Note if the headache occurred near your period. List anything that might have triggered the headache, such as certain foods (chocolate, cheese, wine) or odors, smoke, bright light, stress, or lack of sleep. · Find healthy ways to deal with stress. Headaches are most common during or right after stressful times. Take time to relax before and after you do something that has caused a headache in the past.  · Try to keep your muscles relaxed by keeping good posture. Check your jaw, face, neck, and shoulder muscles for tension, and try relaxing them. When sitting at a desk, change positions often, and stretch for 30 seconds each hour. · Get plenty of sleep and exercise. · Eat regularly and well. Long periods without food can trigger a headache. · Treat yourself to a massage. Some people find that regular massages are very helpful in relieving tension. · Limit caffeine by not drinking too much coffee, tea, or soda. But don't quit caffeine suddenly, because that can also give you headaches. · Reduce eyestrain from computers by blinking frequently and looking away from the computer screen every so often. Make sure you have proper eyewear and that your monitor is set up properly, about an arm's length away. · Seek help if you have depression or anxiety. Your headaches may be linked to these conditions. Treatment can both prevent headaches and help with symptoms of anxiety or depression. When should you call for help? Call 911 anytime you think you may need emergency care. For example, call if:    · You have signs of a stroke. These may include:  ? Sudden numbness, paralysis, or weakness in your face, arm, or leg, especially on only one side of your body.   ? Sudden vision changes. ? Sudden trouble speaking. ? Sudden confusion or trouble understanding simple statements. ? Sudden problems with walking or balance. ? A sudden, severe headache that is different from past headaches.    Call your doctor now or seek immediate medical care if:    · You have a new or worse headache.     · Your headache gets much worse. Where can you learn more? Go to http://aureliano-kael.info/. Enter M271 in the search box to learn more about \"Headache: Care Instructions. \"  Current as of: March 28, 2019  Content Version: 12.2  © 4969-6578 [employer]. Care instructions adapted under license by [employer] (which disclaims liability or warranty for this information). If you have questions about a medical condition or this instruction, always ask your healthcare professional. [place] [address] any warranty or liability for your use of this information. Patient Education        Seizure: Care Instructions  Your Care Instructions    Seizures are caused by abnormal patterns of electrical signals in the brain. They are different for each person. Seizures can affect movement, speech, vision, or awareness. Some people have only slight shaking of a hand and do not pass out. Other people may pass out and have violent shaking of the whole body. Some people appear to stare into space. They are awake, but they can't respond normally. Later, they may not remember what happened. You may need tests to identify the type and cause of the seizures. A seizure may occur only once, or you may have them more than one time. Taking medicines as directed and following up with your doctor may help keep you from having more seizures. The doctor has checked you carefully, but problems can develop later. If you notice any problems or new symptoms, get medical treatment right away. Follow-up care is a key part of your treatment and safety.  Be sure to make and go to all appointments, and call your doctor if you are having problems. It's also a good idea to know your test results and keep a list of the medicines you take. How can you care for yourself at home? · Be safe with medicines. Take your medicines exactly as prescribed. Call your doctor if you think you are having a problem with your medicine. · Do not do any activity that could be dangerous to you or others until your doctor says it is safe to do so. For example, do not drive a car, operate machinery, swim, or climb ladders. · Be sure that anyone treating you for any health problem knows that you have had a seizure and what medicines you are taking for it. · Identify and avoid things that may make you more likely to have a seizure. These may include lack of sleep, alcohol or drug use, stress, or not eating. · Make sure you go to your follow-up appointment. When should you call for help? Call 911 anytime you think you may need emergency care. For example, call if:    · You have another seizure.     · You have more than one seizure in 24 hours.     · You have new symptoms, such as trouble walking, speaking, or thinking clearly.    Call your doctor now or seek immediate medical care if:    · You are not acting normally.    Watch closely for changes in your health, and be sure to contact your doctor if you have any problems. Where can you learn more? Go to http://aureliano-kael.info/. Enter A487 in the search box to learn more about \"Seizure: Care Instructions. \"  Current as of: March 28, 2019  Content Version: 12.2  © 9532-2453 [employer]. Care instructions adapted under license by [employer] (which disclaims liability or warranty for this information). If you have questions about a medical condition or this instruction, always ask your healthcare professional. [address] any warranty or liability for your use of this information.

## 2020-02-26 NOTE — ED NOTES
Pt no longer shaking and is resting with eyes closed in no acute distress, resp rate even and none labored.

## 2020-02-26 NOTE — ED PROVIDER NOTES
Per nurse's notes: \"Pt states that he is having a seizure. Appears to be shaking all over but is able to talk and answer all questions without problems. Pt also states that he has been compliant with his meds\"        The history is provided by the patient and a significant other. Seizure    This is a recurrent problem. The current episode started 12 to 24 hours ago. The problem has not changed since onset. Number of times: unclear. Associated symptoms include headaches. Pertinent negatives include no confusion, no speech difficulty, no visual disturbance, no neck stiffness, no sore throat, no chest pain, no cough, no vomiting, no diarrhea and no muscle weakness. Characteristics include rhythmic jerking. Characteristics do not include eye blinking, eye deviation, bowel incontinence, bladder incontinence, loss of consciousness, bit tongue, apnea or cyanosis. The episode was witnessed. There was no sensation of an aura present. There was return to baseline postseizure. The seizures did not continue in the ED. The seizure(s) had no focality. Possible causes do not include medication or dosage change, sleep deprivation, missed seizure meds, recent illness, change in alcohol use, stress, head injury or missed seizure meds. There has been no fever. He reports headaches. He reports no chest pain, no confusion, no visual disturbance, no diarrhea, no vomiting, no sore throat, no muscle weakness, no stiff neck, no speech difficulty, and no cough. There were no medications administered prior to arrival. Home seizure medications include: Keppra. Past Medical History:   Diagnosis Date    Seizures ([address]     multiple seizures in 2011 only    Stroke [facility]) 2004       History reviewed. No pertinent surgical history. History reviewed. No pertinent family history.     Social History     Socioeconomic History    Marital status: SINGLE     Spouse name: Not on file    Number of children: Not on file    Years of education: Not on file    Highest education level: Not on file   Occupational History    Not on file   Social Needs    Financial resource strain: Not on file    Food insecurity:     Worry: Not on file     Inability: Not on file    Transportation needs:     Medical: Not on file     Non-medical: Not on file   Tobacco Use    Smoking status: Never Smoker    Smokeless tobacco: Never Used   Substance and Sexual Activity    Alcohol use: Yes     Comment: occasional    Drug use: No    Sexual activity: Not on file   Lifestyle    Physical activity:     Days per week: Not on file     Minutes per session: Not on file    Stress: Not on file   Relationships    Social connections:     Talks on phone: Not on file     Gets together: Not on file     Attends Sabianist service: Not on file     Active member of club or organization: Not on file     Attends meetings of clubs or organizations: Not on file     Relationship status: Not on file    Intimate partner violence:     Fear of current or ex partner: Not on file     Emotionally abused: Not on file     Physically abused: Not on file     Forced sexual activity: Not on file   Other Topics Concern    Not on file   Social History Narrative    Not on file         ALLERGIES: Patient has no known allergies. Review of Systems   Constitutional: Negative for chills and fever. HENT: Positive for ear pain. Negative for ear discharge and sore throat. Eyes: Negative for visual disturbance. Respiratory: Negative for apnea and cough. Cardiovascular: Negative for chest pain, palpitations, leg swelling and cyanosis. Gastrointestinal: Negative for abdominal pain, bowel incontinence, diarrhea and vomiting. Genitourinary: Negative for bladder incontinence and dysuria. Neurological: Positive for headaches. Negative for dizziness, loss of consciousness, speech difficulty and light-headedness. Psychiatric/Behavioral: Negative for confusion.    All other systems reviewed and are negative. Vitals:    02/25/20 2021   BP: 158/88   Pulse: (!) 108   Resp: 20   Temp: 97.8 °F (36.6 °C)   SpO2: 100%   Weight: 54.4 kg (120 lb)   Height: 5' 8\" (1.727 m)            Physical Exam  Vitals signs and nursing note reviewed. Constitutional:       General: He is in acute distress (mild). Appearance: He is well-developed. HENT:      Head: Normocephalic and atraumatic. Right Ear: External ear normal.      Left Ear: External ear normal.   Eyes:      Conjunctiva/sclera: Conjunctivae normal.      Pupils: Pupils are equal, round, and reactive to light. Neck:      Musculoskeletal: Normal range of motion and neck supple. Cardiovascular:      Rate and Rhythm: Normal rate and regular rhythm. Heart sounds: Normal heart sounds. Pulmonary:      Effort: Pulmonary effort is normal.      Breath sounds: Normal breath sounds. Abdominal:      General: Bowel sounds are normal.      Palpations: Abdomen is soft. Tenderness: There is no abdominal tenderness. Musculoskeletal: Normal range of motion. Skin:     General: Skin is warm and dry. Capillary Refill: Capillary refill takes less than 2 seconds. Neurological:      Mental Status: He is alert and oriented to person, place, and time. Cranial Nerves: Cranial nerves are intact. Sensory: Sensation is intact. Motor: Motor function is intact. Coordination: Coordination is intact. Deep Tendon Reflexes: Reflexes normal.   Psychiatric:         Mood and Affect: Mood and affect normal.         Speech: Speech normal.         Behavior: Behavior is cooperative.          Cognition and Memory: Cognition and memory normal.          MDM  Number of Diagnoses or Management Options  Nonintractable episodic headache, unspecified headache type: new and does not require workup  Seizure disorder ([facility] [address]): new and does not require workup     Amount and/or Complexity of Data Reviewed  Review and summarize past medical records: yes    Risk of Complications, Morbidity, and/or Mortality  Presenting problems: low  Diagnostic procedures: minimal  Management options: low    Patient Progress  Patient progress: improved         Procedures

## 2020-02-26 NOTE — ED NOTES
I have reviewed discharge instructions with the patient. The patient verbalized understanding. Patient left ED via Discharge Method: wheelchair to Home with female friend. Opportunity for questions and clarification provided. Patient given 0 scripts. Pt in no acute distress at discharge and is not shaking all over        To continue your aftercare when you leave the hospital, you may receive an automated call from our care team to check in on how you are doing. This is a free service and part of our promise to provide the best care and service to meet your aftercare needs.  If you have questions, or wish to unsubscribe from this service please call [phone]. Thank you for Choosing our [facility] Emergency Department.

## 2020-02-26 NOTE — ED TRIAGE NOTES
Pt states that he is having a seizure. Appears to be shaking all over but is able to talk and answer all questions without problems.   Pt also states that he has been compliant with his meds

## 2020-06-29 ENCOUNTER — HOSPITAL ENCOUNTER (EMERGENCY)
Age: 30
Discharge: HOME | End: 2020-06-29
Attending: EMERGENCY MEDICINE
Payer: SELF-PAY

## 2020-06-29 VITALS
TEMPERATURE: 98 F | OXYGEN SATURATION: 98 % | HEART RATE: 62 BPM | WEIGHT: 132 LBS | SYSTOLIC BLOOD PRESSURE: 111 MMHG | BODY MASS INDEX: 20 KG/M2 | DIASTOLIC BLOOD PRESSURE: 64 MMHG | RESPIRATION RATE: 14 BRPM | HEIGHT: 68 IN

## 2020-06-29 DIAGNOSIS — M62.830 BACK SPASM: Primary | ICD-10-CM

## 2020-06-29 PROCEDURE — 99283 EMERGENCY DEPT VISIT LOW MDM: CPT

## 2020-06-29 PROCEDURE — 74011250636 HC RX REV CODE- 250/636: Performed by: EMERGENCY MEDICINE

## 2020-06-29 PROCEDURE — 74011250637 HC RX REV CODE- 250/637: Performed by: EMERGENCY MEDICINE

## 2020-06-29 PROCEDURE — 96372 THER/PROPH/DIAG INJ SC/IM: CPT

## 2020-06-29 RX ORDER — HYDROCODONE BITARTRATE AND ACETAMINOPHEN 5; 325 MG/1; MG/1
1 TABLET ORAL
Qty: 12 TAB | Refills: 0 | Status: SHIPPED | OUTPATIENT
Start: 2020-06-29 | End: 2020-07-02

## 2020-06-29 RX ORDER — METHOCARBAMOL 750 MG/1
750 TABLET, FILM COATED ORAL 4 TIMES DAILY
Qty: 20 TAB | Refills: 0 | Status: SHIPPED | OUTPATIENT
Start: 2020-06-29 | End: 2021-02-04

## 2020-06-29 RX ORDER — CYCLOBENZAPRINE HCL 10 MG
10 TABLET ORAL
Status: COMPLETED | OUTPATIENT
Start: 2020-06-29 | End: 2020-06-29

## 2020-06-29 RX ORDER — HYDROMORPHONE HYDROCHLORIDE 1 MG/ML
1 INJECTION, SOLUTION INTRAMUSCULAR; INTRAVENOUS; SUBCUTANEOUS ONCE
Status: COMPLETED | OUTPATIENT
Start: 2020-06-29 | End: 2020-06-29

## 2020-06-29 RX ADMIN — HYDROMORPHONE HYDROCHLORIDE 1 MG: 1 INJECTION, SOLUTION INTRAMUSCULAR; INTRAVENOUS; SUBCUTANEOUS at 10:18

## 2020-06-29 RX ADMIN — CYCLOBENZAPRINE HYDROCHLORIDE 10 MG: 10 TABLET, FILM COATED ORAL at 10:18

## 2020-06-29 NOTE — ED PROVIDER NOTES
Presents by EMS from home where he had to be extricated from the roof of his house. He states he went up on the roof to clean the gutters at about 1130 last night and \"threw his back out\" and could not get off the roof. He denies any radiation of the pain to the lower extremities he denies any paresthesias or bowel or bladder incontinence and reports multiple prior similar occurrences. The history is provided by the patient. Back Pain    This is a recurrent problem. The current episode started 6 to 12 hours ago. The problem has not changed since onset. The problem occurs constantly. The pain is associated with twisting. The pain is present in the lower back. The quality of the pain is described as shooting. The pain does not radiate. The pain is at a severity of 10/10. The pain is severe. The symptoms are aggravated by bending and twisting. The pain is the same all the time. Pertinent negatives include no chest pain, no fever, no numbness, no weight loss, no headaches, no abdominal pain, no abdominal swelling, no bowel incontinence, no perianal numbness, no bladder incontinence, no dysuria, no pelvic pain, no leg pain, no paresthesias, no paresis, no tingling and no weakness. He has tried nothing for the symptoms. The treatment provided no relief. The patient's surgical history non-contributory        Past Medical History:   Diagnosis Date    Seizures ([address]     multiple seizures in 2011 only    Stroke [facility]) 2004       No past surgical history on file. No family history on file.     Social History     Socioeconomic History    Marital status: SINGLE     Spouse name: Not on file    Number of children: Not on file    Years of education: Not on file    Highest education level: Not on file   Occupational History    Not on file   Social Needs    Financial resource strain: Not on file    Food insecurity     Worry: Not on file     Inability: Not on file    Transportation needs     Medical: Not on file Non-medical: Not on file   Tobacco Use    Smoking status: Never Smoker    Smokeless tobacco: Never Used   Substance and Sexual Activity    Alcohol use: Yes     Comment: occasional    Drug use: No    Sexual activity: Not on file   Lifestyle    Physical activity     Days per week: Not on file     Minutes per session: Not on file    Stress: Not on file   Relationships    Social connections     Talks on phone: Not on file     Gets together: Not on file     Attends [employer]ist service: Not on file     Active member of club or organization: Not on file     Attends meetings of clubs or organizations: Not on file     Relationship status: Not on file    Intimate partner violence     Fear of current or ex partner: Not on file     Emotionally abused: Not on file     Physically abused: Not on file     Forced sexual activity: Not on file   Other Topics Concern    Not on file   Social History Narrative    Not on file         ALLERGIES: Patient has no known allergies. Review of Systems   Constitutional: Negative for fever and weight loss. Cardiovascular: Negative for chest pain. Gastrointestinal: Negative for abdominal pain and bowel incontinence. Genitourinary: Negative for bladder incontinence, dysuria and pelvic pain. Musculoskeletal: Positive for back pain. Neurological: Negative for tingling, weakness, numbness, headaches and paresthesias. All other systems reviewed and are negative. Vitals:    06/29/20 0940   BP: 115/70   Pulse: 73   Resp: 16   Temp: 98 °F (36.7 °C)   SpO2: 99%   Weight: 59.9 kg (132 lb)   Height: 5' 8\" (1.727 m)            Physical Exam  Vitals signs and nursing note reviewed. Constitutional:       General: He is not in acute distress. Appearance: Normal appearance. He is not ill-appearing, toxic-appearing or diaphoretic. HENT:      Head: Normocephalic and atraumatic. Eyes:      Extraocular Movements: Extraocular movements intact.       Conjunctiva/sclera: Conjunctivae normal.      Pupils: Pupils are equal, round, and reactive to light. Neck:      Musculoskeletal: Normal range of motion and neck supple. No neck rigidity. Cardiovascular:      Rate and Rhythm: Normal rate. Pulmonary:      Effort: Pulmonary effort is normal.   Abdominal:      Palpations: Abdomen is soft. Tenderness: There is no abdominal tenderness. Musculoskeletal: Normal range of motion. General: Tenderness present. No deformity or signs of injury. Comments: Tenderness noted to lumbar musculature with exacerbation with straight leg raise. Skin:     General: Skin is warm and dry. Capillary Refill: Capillary refill takes less than 2 seconds. Neurological:      General: No focal deficit present. Mental Status: He is alert and oriented to person, place, and time. Mental status is at baseline. Sensory: No sensory deficit. Motor: No weakness. Deep Tendon Reflexes: Reflexes normal.   Psychiatric:         Mood and Affect: Mood normal.         Behavior: Behavior normal.         Thought Content:  Thought content normal.          MDM  Number of Diagnoses or Management Options  Risk of Complications, Morbidity, and/or Mortality  Presenting problems: low  Diagnostic procedures: minimal  Management options: low    Patient Progress  Patient progress: stable         Procedures

## 2020-06-29 NOTE — ED TRIAGE NOTES
Patient advises history of back problems and went up on roof last night around 2330 to clean gutters and back went out, states he was stuck up there until about 830 today when someone saw him and was able to call 911. Fire department came and assisted patient down with ladder truck. Patient advises lower back pain with radiation down left leg. Advises past back issue after forklift hit him in back. Mask on during triage.

## 2020-06-29 NOTE — DISCHARGE INSTRUCTIONS
Patient Education        Back Spasm: Care Instructions  Your Care Instructions  A back spasm is sudden tightness and pain in your back muscles. It may happen from overuse or an injury. Things like sleeping in an awkward way, bending, lifting, standing, or sitting can sometimes cause a spasm. But the cause isn't always clear. Home treatment includes using heat or ice, taking over-the-counter (OTC) pain medicines, and avoiding activities that may cause back pain. For a back spasm that doesn't get better with home care, your doctor may prescribe medicine. Treatments such as massage or manipulation may also help ease a back spasm. Your doctor may also suggest exercise or physical therapy to help improve strength and flexibility in your back muscles. In most cases, getting back to your normal activities is good for your back. Just make sure to avoid doing things that make your pain worse. Follow-up care is a key part of your treatment and safety. Be sure to make and go to all appointments, and call your doctor if you are having problems. It's also a good idea to know your test results and keep a list of the medicines you take. How can you care for yourself at home? Heat, ice, and medicines  · To relieve pain, use heat or ice (whichever feels better) on the affected area. ? Put a warm water bottle, a heating pad set on low, or a warm cloth on your back. Put a thin cloth between the heating pad and your skin. Do not go to sleep with a heating pad on your skin. ? Try ice or a cold pack on the area for 10 to 20 minutes at a time. Put a thin cloth between the ice and your skin. · For most back pain you can take over-the-counter pain medicine. Nonsteroidal anti-inflammatory drugs (NSAIDs) such as ibuprofen or naproxen seem to work best. But if you can't take NSAIDs you can try acetaminophen. Your doctor can prescribe stronger medicines if needed. Be safe with medicines.  Read and follow all instructions on the label.  Body positions and posture  · Sit or lie in positions that are most comfortable for you and that reduce pain. Try one of these positions when you lie down:  ? Lie on your back with your knees bent and supported by large pillows. ? Lie on the floor with your legs on the seat of a sofa or chair. ? Lie on your side with your knees and hips bent and a pillow between your legs. ? Lie on your stomach if it does not make pain worse. · Do not sit up in bed. Avoid soft couches and twisted positions. · Avoid bed rest after the first day of back pain. Bed rest can help relieve pain at first, but it delays healing. Continued rest without activity is usually not good for your back. · If you must sit for long periods of time, take breaks from sitting. Change positions every 30 minutes. Get up and walk around, or lie in a comfortable position. Activity  · Take short walks several times a day. You can start with 5 to 10 minutes, 3 or 4 times a day, and work up to longer walks. Walk on level surfaces and avoid hills and stairs until your back starts to feel better. · After your back spasm starts to feel better, try to stretch your muscles every day, especially before and after exercise and at bedtime. Regular stretching can help relax your muscles. · To prevent future back pain, do exercises to stretch and strengthen your back and stomach. Learn to use good posture, safe lifting techniques, and other ways to move to help you avoid back pain. When should you call for help? [id number] anytime you think you may need emergency care. For example, call if:  · You are unable to move an arm or a leg at all. Call your doctor now or seek immediate medical care if:  · You have new or worse symptoms in your legs, belly, or buttocks. Symptoms may include:  ? Numbness or tingling. ? Weakness. ? Pain. · You lose bladder or bowel control.   Watch closely for changes in your health, and be sure to contact your doctor if:  · You have a fever, lose weight, or don't feel well. · You do not get better as expected. Where can you learn more? Go to http://aureliano-kael.info/  Enter E232 in the search box to learn more about \"Back Spasm: Care Instructions. \"  Current as of: March 2, 2020               Content Version: 12.5  © 9269-6500 [employer]. Care instructions adapted under license by [employer] (which disclaims liability or warranty for this information). If you have questions about a medical condition or this instruction, always ask your healthcare professional. [place] [address] any warranty or liability for your use of this information.

## 2020-06-29 NOTE — ED NOTES
I have reviewed discharge instructions with the patient. The patient verbalized understanding. Patient left ED via Discharge Method: ambulatory to Home. Pt to lobby to call for ride. Opportunity for questions and clarification provided. Patient given 2 scripts. Norco & Robaxin. F/u with chiropractor/PCP and perform back stretches given. To continue your aftercare when you leave the hospital, you may receive an automated call from our care team to check in on how you are doing. This is a free service and part of our promise to provide the best care and service to meet your aftercare needs.  If you have questions, or wish to unsubscribe from this service please call [phone]. Thank you for Choosing our [facility] Emergency Department.

## 2020-07-31 ENCOUNTER — HOSPITAL ENCOUNTER (EMERGENCY)
Age: 30
Discharge: HOME | End: 2020-07-31

## 2020-07-31 ENCOUNTER — APPOINTMENT (OUTPATIENT)
Dept: GENERAL RADIOLOGY | Age: 30
End: 2020-07-31

## 2020-07-31 VITALS
BODY MASS INDEX: 21.22 KG/M2 | OXYGEN SATURATION: 98 % | WEIGHT: 140 LBS | HEIGHT: 68 IN | HEART RATE: 86 BPM | SYSTOLIC BLOOD PRESSURE: 118 MMHG | TEMPERATURE: 98 F | RESPIRATION RATE: 16 BRPM | DIASTOLIC BLOOD PRESSURE: 72 MMHG

## 2020-07-31 DIAGNOSIS — M54.50 LOW BACK PAIN, UNSPECIFIED BACK PAIN LATERALITY, UNSPECIFIED CHRONICITY, UNSPECIFIED WHETHER SCIATICA PRESENT: Primary | ICD-10-CM

## 2020-07-31 PROCEDURE — 99284 EMERGENCY DEPT VISIT MOD MDM: CPT

## 2020-07-31 NOTE — ED PROVIDER NOTES
40-year-old male complaining of low back pain the patient states \"I was meditating and did not exhale properly\" when actually he got arrested by the police and now is complaining of back pain. No complaints of numbness or tingling no difficulty urinating or other neurological complaints. Back Pain    This is a chronic problem. The problem occurs constantly. The pain is associated with excercise. Past Medical History:   Diagnosis Date    Seizures ([facility] [address]     multiple seizures in 2011 only    Stroke [facility]) 2004       No past surgical history on file. No family history on file.     Social History     Socioeconomic History    Marital status: SINGLE     Spouse name: Not on file    Number of children: Not on file    Years of education: Not on file    Highest education level: Not on file   Occupational History    Not on file   Social Needs    Financial resource strain: Not on file    Food insecurity     Worry: Not on file     Inability: Not on file    Transportation needs     Medical: Not on file     Non-medical: Not on file   Tobacco Use    Smoking status: Never Smoker    Smokeless tobacco: Never Used   Substance and Sexual Activity    Alcohol use: Yes     Comment: occasional    Drug use: No    Sexual activity: Not on file   Lifestyle    Physical activity     Days per week: Not on file     Minutes per session: Not on file    Stress: Not on file   Relationships    Social connections     Talks on phone: Not on file     Gets together: Not on file     Attends [employer] service: Not on file     Active member of club or organization: Not on file     Attends meetings of clubs or organizations: Not on file     Relationship status: Not on file    Intimate partner violence     Fear of current or ex partner: Not on file     Emotionally abused: Not on file     Physically abused: Not on file     Forced sexual activity: Not on file   Other Topics Concern    Not on file   Social History Narrative    Not on file         ALLERGIES: Patient has no known allergies. Review of Systems   Constitutional: Negative. Negative for activity change. HENT: Negative. Eyes: Negative. Respiratory: Negative. Cardiovascular: Negative. Gastrointestinal: Negative. Genitourinary: Negative. Musculoskeletal: Positive for back pain. Skin: Negative. Neurological: Negative. Psychiatric/Behavioral: Negative. All other systems reviewed and are negative. Vitals:    07/31/20 0621   BP: 109/69   Pulse: 99   Resp: 18   Temp: 98.4 °F (36.9 °C)   SpO2: 97%   Weight: 63.5 kg (140 lb)   Height: 5' 8\" (1.727 m)            Physical Exam  Vitals signs and nursing note reviewed. Constitutional:       General: He is not in acute distress. Appearance: He is well-developed. He is not diaphoretic. HENT:      Head: Normocephalic and atraumatic. Right Ear: External ear normal.      Left Ear: External ear normal.      Nose: Nose normal.      Mouth/Throat:      Pharynx: No oropharyngeal exudate. Eyes:      General: No scleral icterus. Right eye: No discharge. Left eye: No discharge. Conjunctiva/sclera: Conjunctivae normal.      Pupils: Pupils are equal, round, and reactive to light. Neck:      Musculoskeletal: Normal range of motion and neck supple. Vascular: No JVD. Trachea: No tracheal deviation. Cardiovascular:      Rate and Rhythm: Normal rate. Pulmonary:      Effort: Pulmonary effort is normal. No respiratory distress. Breath sounds: No stridor. No wheezing. Chest:      Chest wall: No tenderness. Abdominal:      General: There is no distension. Palpations: There is no mass. Tenderness: There is no abdominal tenderness. Musculoskeletal: Normal range of motion. General: No tenderness. Skin:     General: Skin is warm and dry. Coloration: Skin is not pale. Findings: No erythema or rash.    Neurological:      Mental Status: He is alert and oriented to person, place, and time. Cranial Nerves: No cranial nerve deficit. Psychiatric:         Attention and Perception: He is inattentive. Thought Content: Thought content normal.          MDM  Number of Diagnoses or Management Options  Diagnosis management comments: The patient was playing a video game on his phone during the entire interview when I tried to examine him he would not lean forward but just continued to play the game sitting in the wheelchair. I asked him several times if he put the game down for a minute and let me look at his back and he did not do it he continued to play he never looked up from the game. The game was some sort of space invader video game.     Since the patient was not willing to participate in his care he was discharged    Risk of Complications, Morbidity, and/or Mortality  Presenting problems: low  Diagnostic procedures: low  Management options: low    Patient Progress  Patient progress: stable         Procedures

## 2020-07-31 NOTE — ED TRIAGE NOTES
Pt comes with [facility] 24 c/o back pain. EMS stated he was found by police last night walking around the [facility]. As they police approached him he fell and could not move his legs. Pt was able to self transfer by standing from EMS stretcher to [facility] without assistance. PT stated he was here last time with a similar complaint and was given a \" shot \" that made him feel better.  Pt c/o \" whole back pain\"

## 2020-07-31 NOTE — ED NOTES
I have reviewed discharge instructions with the patient. The patient verbalized understanding. Patient left ED via Discharge Method: ambulatory to Home with self. Opportunity for questions and clarification provided. Patient given 0 scripts. To continue your aftercare when you leave the hospital, you may receive an automated call from our care team to check in on how you are doing. This is a free service and part of our promise to provide the best care and service to meet your aftercare needs.  If you have questions, or wish to unsubscribe from this service please call [phone]. Thank you for Choosing our [facility] Emergency Department.

## 2021-02-04 PROBLEM — Z86.59 HISTORY OF SCHIZOPHRENIA: Status: ACTIVE | Noted: 2021-02-04

## 2021-02-04 PROBLEM — Z86.59 HISTORY OF BIPOLAR DISORDER: Status: ACTIVE | Noted: 2021-02-04

## 2021-02-04 PROBLEM — F12.90 MARIJUANA USER: Status: ACTIVE | Noted: 2021-02-04

## 2021-03-07 ENCOUNTER — HOSPITAL ENCOUNTER (EMERGENCY)
Age: 31
Discharge: HOME | End: 2021-03-07
Attending: EMERGENCY MEDICINE
Payer: MEDICAID

## 2021-03-07 VITALS
SYSTOLIC BLOOD PRESSURE: 115 MMHG | DIASTOLIC BLOOD PRESSURE: 83 MMHG | WEIGHT: 145 LBS | HEIGHT: 66 IN | OXYGEN SATURATION: 98 % | HEART RATE: 73 BPM | BODY MASS INDEX: 23.3 KG/M2 | RESPIRATION RATE: 16 BRPM | TEMPERATURE: 97.7 F

## 2021-03-07 DIAGNOSIS — M54.50 CHRONIC BILATERAL LOW BACK PAIN WITHOUT SCIATICA: Primary | ICD-10-CM

## 2021-03-07 DIAGNOSIS — G89.29 CHRONIC BILATERAL LOW BACK PAIN WITHOUT SCIATICA: Primary | ICD-10-CM

## 2021-03-07 PROCEDURE — 99283 EMERGENCY DEPT VISIT LOW MDM: CPT

## 2021-03-07 RX ORDER — PREDNISONE 20 MG/1
40 TABLET ORAL DAILY
Qty: 10 TAB | Refills: 0 | Status: SHIPPED | OUTPATIENT
Start: 2021-03-07 | End: 2021-03-12

## 2021-03-07 RX ORDER — KETOROLAC TROMETHAMINE 30 MG/ML
30 INJECTION, SOLUTION INTRAMUSCULAR; INTRAVENOUS
Status: DISCONTINUED | OUTPATIENT
Start: 2021-03-07 | End: 2021-03-07 | Stop reason: HOSPADM
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
## 2021-03-07 NOTE — ED NOTES
I have reviewed discharge instructions with the patient. The patient verbalized understanding. Patient left ED via Discharge Method: ambulatory to Home with self    Opportunity for questions and clarification provided. Patient given 1 scripts. To continue your aftercare when you leave the hospital, you may receive an automated call from our care team to check in on how you are doing. This is a free service and part of our promise to provide the best care and service to meet your aftercare needs.  If you have questions, or wish to unsubscribe from this service please call [phone]. Thank you for Choosing our [facility] Emergency Department.

## 2021-03-07 NOTE — DISCHARGE INSTRUCTIONS
Take the steroid as prescribed. You may take Tylenol and ibuprofen for pain as well. Please follow-up with the primary doctor and if symptoms persist or worsen you may need a pain management specialist.  Recommend applying ice to the low back for symptom relief.

## 2021-03-09 ENCOUNTER — HOSPITAL ENCOUNTER (OUTPATIENT)
Dept: GENERAL RADIOLOGY | Age: 31
Discharge: HOME | End: 2021-03-09

## 2021-03-09 DIAGNOSIS — G89.29 CHRONIC LEFT-SIDED LOW BACK PAIN WITH LEFT-SIDED SCIATICA: ICD-10-CM

## 2021-03-09 DIAGNOSIS — M54.9 MID BACK PAIN: ICD-10-CM

## 2021-03-09 DIAGNOSIS — M54.42 CHRONIC LEFT-SIDED LOW BACK PAIN WITH LEFT-SIDED SCIATICA: ICD-10-CM

## 2021-04-29 ENCOUNTER — HOSPITAL ENCOUNTER (OUTPATIENT)
Dept: PHYSICAL THERAPY | Age: 31
Discharge: HOME | End: 2021-04-29
Payer: MEDICAID

## 2021-04-29 DIAGNOSIS — M54.9 CHRONIC BACK PAIN, UNSPECIFIED BACK LOCATION, UNSPECIFIED BACK PAIN LATERALITY: ICD-10-CM

## 2021-04-29 DIAGNOSIS — G89.29 CHRONIC BACK PAIN, UNSPECIFIED BACK LOCATION, UNSPECIFIED BACK PAIN LATERALITY: ICD-10-CM

## 2021-04-29 PROCEDURE — 97110 THERAPEUTIC EXERCISES: CPT

## 2021-04-29 PROCEDURE — 97161 PT EVAL LOW COMPLEX 20 MIN: CPT

## 2021-04-29 NOTE — THERAPY EVALUATION
Amie Henry  : 1990  Primary: [place] Batista Medicaid  Secondary:  [address] at [facility]  [place] , [address], [place] [address]  [phone]   [phone]           OUTPATIENT PHYSICAL THERAPY:Initial Assessment 2021   ICD-10: Treatment Diagnosis: M54.5 Low back pain  M62.81 Weakness  R26.2 Difficulty walking  Precautions/Allergies:   Patient has no known allergies. TREATMENT PLAN:  Effective Dates: 2021 TO 2021 (60 days). Frequency/Duration: 2 times a week for 60 Day(s) MEDICAL/REFERRING DIAGNOSIS:  Chronic back pain, unspecified back location, unspecified back pain laterality [M54.9, G89.29]   DATE OF ONSET:   REFERRING PHYSICIAN: Jeni Matthews MD MD Orders: Evaluate and treat  Return MD Appointment: TBD     INITIAL ASSESSMENT:  Mr. Sonjia Mortimer presents with complaints of lower back that radiates up to the left paraspinal that radiates into bilateral lower extremities. PROBLEM LIST (Impacting functional limitations):  1. Decreased Strength  2. Decreased ADL/Functional Activities  3. Increased Pain  4. Decreased Activity Tolerance  5. Decreased Flexibility/Joint Mobility  6. Decreased Hooks with Home Exercise Program INTERVENTIONS PLANNED: (Treatment may consist of any combination of the following)  1. Home Exercise Program (HEP)  2. Manual Therapy  3. Neuromuscular Re-education/Strengthening  4. Therapeutic Activites  5. Therapeutic Exercise/Strengthening     GOALS: (Goals have been discussed and agreed upon with patient.)  Short-Term Functional Goals: Time Frame: 3 weeks  1. Independent HEP of lumbar and hip stretches  2. Improve lumbar flexion to 50 degrees to improve bending activities  Discharge Goals: Time Frame: 6 weeks  1. Independent HEP of core and lumbar stabilization exercises  2. Improve ROM to WNL in all planes to allow for greater ease with ADL's  3. Decrease pain to 2/10 with standing and walking > 45 minutes  4.  Improve Oswestry to 15/50 to allow for minimal restrictions with ADL's    OUTCOME MEASURE:   Tool Used: Modified Oswestry Low Back Pain Questionnaire  Score:  Initial: 36/50  Most Recent: X/50 (Date: -- )   Interpretation of Score: Each section is scored on a 0-5 scale, 5 representing the greatest disability. The scores of each section are added together for a total score of 50. MEDICAL NECESSITY:   · Patient will require skilled therapeutic intervention to improve ROM, strength, decrease pain  to allow for  performance of ADL with minimal restrictions. REASON FOR SERVICES/OTHER COMMENTS:  · Patient will require skilled therapeutic intervention to address impairments assessed  to allow for minimal restrictions with ADL's. Total Duration: 25 minutes  PT Patient Time In/Time Out  Time In: 1500  Time Out: 1545    Rehabilitation Potential For Stated Goals: Good  Regarding Tonie Baumann's therapy, I certify that the treatment plan above will be carried out by a therapist or under their direction. Thank you for this referral,  Panchito Gary PT     Referring Physician Signature: Darrion Bhatt MD _______________________________ Date _____________      PAIN/SUBJECTIVE:   Initial: Pain Intensity 1: 6  Post Session:  6/10   HISTORY:   History of Injury/Illness (Reason for Referral):  Patient reports he was struck by a forklift in his in 2019, received PT. He reports pain in the lower back that radiates up to the left paraspinal area that radiates into bilateral lower extremities. Pain is worse with bending walking standing > 30 minutes also with sitting < 30 minutes. Reports tingling to left knee occasionally. Patient's goal is to be able to stand, sit and sleep comfortably. Patient referred to PT for evaluation and treatment  Past Medical History/Comorbidities:   Mr. José Manuel Mccullough  has a past medical history of Anxiety, Epilepsy ([address] Seizures ([address] and Stroke ([facility] [address] (2004).   Mr. José Manuel Mccullough  has no past surgical history on file. Social History/Living Environment:    Lives with family in single story home; 20 steps to enter  Prior Level of Function/Work/Activity:  Disability  Dominant Side:         RIGHT   Ambulatory/Rehab Services H2 Model Falls Risk Assessment   Risk Factors:       (1)  Gender [Male] Ability to Rise from Chair:       (1)  Pushes up, successful in one attempt   Falls Prevention Plan:       No modifications necessary   Total: (5 or greater = High Risk): 2   ©2010 [facility] [place] [address] [place] [address] Patent #[id number]. Federal Law prohibits the replication, distribution or use without written permission from [facility] [employer]   Current Medications:       Current Outpatient Medications:     tiZANidine (ZANAFLEX) 2 mg tablet, Take 1 Tab by mouth daily as needed for Muscle Spasm(s). , Disp: 15 Tab, Rfl: 0    ergocalciferol (ERGOCALCIFEROL) 1,250 mcg (50,000 unit) capsule, Take 1 Cap by mouth every seven (7) days. , Disp: 90 Cap, Rfl: 3    benztropine (COGENTIN) 1 mg tablet, TAKE 1 TABLET BY MOUTH AT BEDTIME, Disp: , Rfl:     divalproex DR (DEPAKOTE) 500 mg tablet, Take 1,000 mg by mouth two (2) times a day., Disp: , Rfl:    Date Last Reviewed:  4/29/21   Number of Personal Factors/Comorbidities that affect the Plan of Care: 0: LOW COMPLEXITY0: LOW COMPLEXITY   EXAMINATION:   Observation/Orthostatic Postural Assessment:          Decreased thoracic kyphosis and lumbar lordosis        Leg length:  Right 91 cm     Left 92 cm  Palpation:          Hypomobile CPA T6-9, L2-S1  ROM:     Date:  4/29/21       Right Left   Lumbar flexion 40    Lumbar extension 10    Rotation 50% 50%   Side bending 50% 50%        Hamstring tightness mild moderate          Strength:     Date:  4/29/21       Right Left   Hip flexion 5 5   Knee extension 5- 5-   Knee flexion 4+ 4+   Ankle dorsiflexion 5 4+        Decreased trunk control with LLE testing     Body Structures Involved:  1. Nerves  2. Bones  3. Joints  4. Muscles Body Functions Affected:  1. Sensory/Pain  2. Neuromusculoskeletal  3. Movement Related Activities and Participation Affected:  1. Learning and Applying Knowledge  2. General Tasks and Demands  3. Mobility  4. Self Care  5.  Domestic Life   Number of elements (examined above) that affect the Plan of Care: 1-2: LOW COMPLEXITY   CLINICAL PRESENTATION:   Presentation: Stable and uncomplicated: LOW COMPLEXITY   CLINICAL DECISION MAKING:   Use of outcome tool(s) and clinical judgement create a POC that gives a: Clear prediction of patient's progress: LOW COMPLEXITY

## 2021-04-30 NOTE — PROGRESS NOTES
Osbaldo Francisco  : 1990  Primary: Miguelimer Scarce Medicaid  Secondary:  [facility] at [place] [address], [address], [place] [address]  [phone]   [phone]      OUTPATIENT PHYSICAL THERAPY: Daily Treatment Note 2021  Visit Count:  1    ICD-10: Treatment Diagnosis: M54.5 Low back pain  M62.81 Weakness  R26.2 Difficulty walking  Precautions/Allergies:   Patient has no known allergies. TREATMENT PLAN:  Effective Dates: 2021 TO 2021 (60 days). Frequency/Duration: 2 times a week for 60 Day(s) MEDICAL/REFERRING DIAGNOSIS:  Chronic back pain, unspecified back location, unspecified back pain laterality [M54.9, G89.29]   DATE OF ONSET:   REFERRING PHYSICIAN: Ruth Jain MD MD Orders: Evaluate and treat  Return MD Appointment: TBD         Pre-treatment Symptoms/Complaints: Mr. Amber Guerrero presents with complaints of lower back that radiates up to the left paraspinal that radiates into bilateral lower extremities. Pain: Initial: Pain Intensity 1: 6/10 Post Session:  5/10   Medications Last Reviewed:  2021  Updated Objective Findings:  See evaluation note from today  TREATMENT:     THERAPEUTIC EXERCISE: (15 minutes):  Exercises per grid below to improve mobility. Required minimal verbal and tactile cues to promote proper body alignment. Progressed range as indicated. Date:  21 Date:   Date:     Activity/Exercise Parameters Parameters Parameters   SKTC 3 x 20 sec B     LTR 10     Pelvic tilt 10x     Seated lumbar flexion stretch 5 x 10 sec     HEP 5'                     [employer] PortalAccess Code: [email or web address]: [email or web address]: 1Prepared by: Jennifer Almaraz-Krystalxercises   Supine Lower Trunk Rotation - 2 x daily - 7 x weekly - 10 reps   Hooklying Single Knee to Chest - 2 x daily - 7 x weekly - 3 reps - 20 hold   Supine Posterior Pelvic Tilt - 1 x daily - 7 x weekly - 10 reps - 5 hold   Seated Lumbar Flexion Stretch - 1 x daily - 7 x weekly - 10 reps - 5 hold    Treatment/Session Summary:    · Response to Treatment:  Patient had some mild discomfort with certain stretches.   .  · Communication/Consultation:  Patient adivsed to work in pain free ranges  · Equipment provided today:  HEP  · Recommendations/Intent for next treatment session: Next visit will focus on ROM, stretches, core and LE strengthening, and body mechanics training    Total Treatment Billable Duration:  40 minutes ( 25 minutes, 15  Minutes there ex)  PT Patient Time In/Time Out  Time In: 1500  Time Out: [facility], PT    Future Appointments   Date Time Provider Fernandez Zheng   5/3/2021  3:45 PM Fiorella Obregon, PT [facility]   5/5/2021  4:30 PM Jennifer Chung, PT [facility]   5/10/2021  3:15 PM Jennifer Chung, PT [facility]   5/24/2021  3:15 PM Jennifer Chung, PT [facility]   5/26/2021  3:15 PM Jennifer Chung, PT [facility]   6/1/2021  3:15 PM Jennifer Chung, PT [facility]   6/3/2021  4:00 PM Jennifer Chung, PT [facility]   6/8/2021  8:30 AM Dimas Quesada MD [facility] BS[facility]   6/9/2021 10:00 AM Pallavi Malik PA PO[facility] POA   9/7/2021 11:00 AM Darrion Bhatt MD [facility] SAAD

## 2021-05-03 ENCOUNTER — HOSPITAL ENCOUNTER (OUTPATIENT)
Dept: PHYSICAL THERAPY | Age: 31
Discharge: HOME | End: 2021-05-03
Payer: MEDICAID

## 2021-05-03 PROCEDURE — 97110 THERAPEUTIC EXERCISES: CPT

## 2021-05-03 NOTE — PROGRESS NOTES
Chey Brain  : 1990  Primary: Rea Sanon Medicaid  Secondary:  [facility] [place] [address], [address], [place] [address]  [phone]   [phone]      OUTPATIENT PHYSICAL THERAPY: Daily Treatment Note 5/3/2021  Visit Count:  2    ICD-10: Treatment Diagnosis: M54.5 Low back pain  M62.81 Weakness  R26.2 Difficulty walking  Precautions/Allergies:   Patient has no known allergies. TREATMENT PLAN:  Effective Dates: 2021 TO 2021 (60 days). Frequency/Duration: 2 times a week for 60 Day(s) MEDICAL/REFERRING DIAGNOSIS:  Chronic back pain, unspecified back location, unspecified back pain laterality [M54.9, G89.29]   DATE OF ONSET:   REFERRING PHYSICIAN: Abe Barber MD MD Orders: Evaluate and treat  Return MD Appointment: TBD         Pre-treatment Symptoms/Complaints: Mr. Agnieszka Gomez presents with complaints of lower back that radiates up to the left paraspinal that radiates into bilateral lower extremities. Pain: Initial: Pain Intensity 1: 10 Post Session:  5/10 post heat   Medications Last Reviewed:  5/3/2021  Updated Objective Findings:  See evaluation note from today  TREATMENT:     THERAPEUTIC EXERCISE: (55 minutes):  Exercises per grid below to improve mobility and strength. Required minimal visual, verbal and tactile cues to promote proper body alignment and promote proper body posture. Progressed range, repetitions and complexity of movement as indicated.    Date:  21 Date:  5/3/21 Date:     Activity/Exercise Parameters Parameters Parameters   SKTC 3 x 20 sec B 3 x 20 secs B    LTR 10 10x    Pelvic tilt 10x 10x    Seated lumbar flexion stretch 5 x 10 sec 5 x 10 w ball    HEP 5'     Thoracic rotation stretch  2 x 15 sec seated    DKTC  2 x 20 secs    TA bracing  4'    Supine march  2 x 10    SLR  2 x 5:B    Clams  10x B    Hip adduction with ball squeeze  10x     Hip abduction  10x B RTB    Bridges  10x RTB abd    Squats  Sit to stand x 10    Lat pull down  2 x10 GTB      MODALITIES: (10 minutes): *  Hot Pack Therapy in order to provide analgesia and relieve muscle spasm. (no charge)  [employer] Creation PortalAccess Code: [email or web address]  URL: [email or web address]: 04/29/2021Prepared by: Jennifer Almaraz-MontExercises   Supine Lower Trunk Rotation - 2 x daily - 7 x weekly - 10 reps   Hooklying Single Knee to Chest - 2 x daily - 7 x weekly - 3 reps - 20 hold   Supine Posterior Pelvic Tilt - 1 x daily - 7 x weekly - 10 reps - 5 hold   Seated Lumbar Flexion Stretch - 1 x daily - 7 x weekly - 10 reps - 5 hold    Treatment/Session Summary:    · Response to Treatment:  Patient tolerated treatment fairly well today. Slow with transitions between exercises. Noted tremors with SLR and some complaints of knee pain with exercise. · Communication/Consultation:  None today  · Equipment provided today:  None today  · Recommendations/Intent for next treatment session: Next visit will focus on ROM, stretches, core and LE strengthening, and body mechanics training.   Progress as tolerated    Total Treatment Billable Duration:  55 minutes ( 55 minutes)  PT Patient Time In/Time Out  Time In: 1525  Time Out: 1630  Maddie Vargas PT    Future Appointments   Date Time Provider Fernandez Zheng   5/5/2021  4:30 PM Reynold Smith PT [facility]   5/10/2021  3:15 PM [facility]   5/24/2021  3:15 PM [facility] Chung, [facility]   5/26/2021  3:15 PM Jennifer Chung, PT [facility]   6/1/2021  3:15 PM [facility]   6/3/2021  4:00 PM Jennifer Chung, PT [facility]   6/8/2021  8:30 AM Srinivasa Rush MD [facility]   6/9/2021 10:00 AM Pallavi Malik PA [facility] POA   9/7/2021 11:00 AM Cr Platt MD [facility]

## 2021-05-05 ENCOUNTER — HOSPITAL ENCOUNTER (OUTPATIENT)
Dept: PHYSICAL THERAPY | Age: 31
Discharge: HOME | End: 2021-05-05
Payer: MEDICAID

## 2021-05-05 PROCEDURE — 97110 THERAPEUTIC EXERCISES: CPT

## 2021-05-05 NOTE — PROGRESS NOTES
Brittni Tobin  : 1990  Primary: Skyler Castano Medicaid  Secondary:  [facility] [address] [place] [address], [address], [place] [address]  [phone]   [phone]      OUTPATIENT PHYSICAL THERAPY: Daily Treatment Note 2021  Visit Count:  3    ICD-10: Treatment Diagnosis: M54.5 Low back pain  M62.81 Weakness  R26.2 Difficulty walking  Precautions/Allergies:   Patient has no known allergies. TREATMENT PLAN:  Effective Dates: 2021 TO 2021 (60 days). Frequency/Duration: 2 times a week for 60 Day(s) MEDICAL/REFERRING DIAGNOSIS:  Chronic back pain, unspecified back location, unspecified back pain laterality [M54.9, G89.29]   DATE OF ONSET:   REFERRING PHYSICIAN: Alecia Bolton MD MD Orders: Evaluate and treat  Return MD Appointment: TBD         Pre-treatment Symptoms/Complaints: Mr. Isis Veras reports doing better today, did have some muscle soreness post last session  Pain: Initial: Pain Intensity 1: 5/10 Post Session:  5/10 post heat   Medications Last Reviewed:  2021  Updated Objective Findings:  None Today  TREATMENT:     THERAPEUTIC EXERCISE: (45 minutes):  Exercises per grid below to improve mobility and strength. Required minimal visual, verbal and tactile cues to promote proper body alignment and promote proper body posture. Progressed range, repetitions and complexity of movement as indicated. Date:  21 Date:  5/3/21 Date:  21   Activity/Exercise Parameters Parameters Parameters   SKTC 3 x 20 sec B 3 x 20 secs B 2 x 20 sec   LTR 10 10x 10x   Pelvic tilt 10x 10x 10x   Seated lumbar flexion stretch 5 x 10 sec 5 x 10 w ball    HEP 5'     Thoracic rotation stretch  2 x 15 sec seated    DKTC  2 x 20 secs 2 x 20 sec   TA bracing  4'    Supine march  2 x 10 2 x 10   SLR  2 x 5:B 2 x 10; B   Clams  10x B 2 z 10;  B   Hip adduction with ball squeeze  10x  2 x 10   Hip abduction  10x B RTB 2 x 10 RTB   Bridges  10x RTB abd 10x   Squats  Sit to stand x 10    Lat pull down  2 x10 GTB 2 x 10 GTB   PNF D2 chops   10x B   Lateral walking    4 x 15ft RTB     MODALITIES: (0 minutes): *  Hot Pack Therapy in order to provide analgesia and relieve muscle spasm. (not performed)  Class[employer] Connection PortalAccess Code: [email or web address]  URL: [email or web address]/Date: 04/29/2021Prepared by: Jennifer Almaraz-MontExercaugustin   Supine Lower Trunk Rotation - 2 x daily - 7 x weekly - 10 reps   Hooklying Single Knee to Chest - 2 x daily - 7 x weekly - 3 reps - 20 hold   Supine Posterior Pelvic Tilt - 1 x daily - 7 x weekly - 10 reps - 5 hold   Seated Lumbar Flexion Stretch - 1 x daily - 7 x weekly - 10 reps - 5 hold    Treatment/Session Summary:    · Response to Treatment:  Patient did well with the progression of exercises today without increase in symptoms. · Communication/Consultation:  None today  · Equipment provided today:  None today  · Recommendations/Intent for next treatment session: Next visit will focus on ROM, stretches, core and LE strengthening, and body mechanics training.   Progress as tolerated    Total Treatment Billable Duration:  45 minutes (45 minutes)  PT Patient Time In/Time Out  Time In: 1630  Time Out: 1715  Dilshad Bledsoe PT    Future Appointments   Date Time Provider Fernandez Zheng   5/10/2021  3:15 PM Lynnette Larose PT [facility]   5/24/2021  3:15 PM Jennifer Chung PT [facility]   5/26/2021  3:15 PM Jennifer Chung PT [facility]   6/1/2021  3:15 PM [facility]   6/3/2021  4:00 PM [facility]   6/8/2021  8:30 AM Bert Dalring MD BS[facility] BSNE   6/9/2021 10:00 AM Pallavi Malik PA [facility] POA   9/7/2021 11:00 AM Nunu Huddleston MD [facility]

## 2021-05-10 ENCOUNTER — HOSPITAL ENCOUNTER (OUTPATIENT)
Dept: PHYSICAL THERAPY | Age: 31
Discharge: HOME | End: 2021-05-10
Payer: MEDICAID

## 2021-05-10 PROCEDURE — 97110 THERAPEUTIC EXERCISES: CPT

## 2021-05-10 NOTE — PROGRESS NOTES
Pamela Nails  : 1990  Primary: [place] Code Medicaid  Secondary:  [facility] [address], [place] [address]  [phone]   [phone]      OUTPATIENT PHYSICAL THERAPY: Daily Treatment Note 5/10/2021  Visit Count:  4    ICD-10: Treatment Diagnosis: M54.5 Low back pain  M62.81 Weakness  R26.2 Difficulty walking  Precautions/Allergies:   Patient has no known allergies. TREATMENT PLAN:  Effective Dates: 2021 TO 2021 (60 days). Frequency/Duration: 2 times a week for 60 Day(s) MEDICAL/REFERRING DIAGNOSIS:  Chronic back pain, unspecified back location, unspecified back pain laterality [M54.9, G89.29]   DATE OF ONSET:   REFERRING PHYSICIAN: Rivera Boone MD MD Orders: Evaluate and treat  Return MD Appointment: TBD         Pre-treatment Symptoms/Complaints: Mr. Familia Sumner reports doing well today, but feels like he is moving slowly  Pain: Initial: Pain Intensity 1: 4/10 Post Session:  5/10 post heat   Medications Last Reviewed:  5/10/2021  Updated Objective Findings:  None Today  TREATMENT:     THERAPEUTIC EXERCISE: (55 minutes):  Exercises per grid below to improve mobility, strength and coordination. Required minimal visual, verbal and tactile cues to promote proper body alignment and promote proper body posture. Progressed range, repetitions and complexity of movement as indicated. Date:  21 Date:  5/3/21 Date:  21 Date:  5/10/21   Activity/Exercise Parameters Parameters Parameters    SKTC 3 x 20 sec B 3 x 20 secs B 2 x 20 sec 2 x 10 sec    LTR 10 10x 10x 10x   Pelvic tilt 10x 10x 10x 10x   Seated lumbar flexion stretch 5 x 10 sec 5 x 10 w ball     HEP 5'      Thoracic rotation stretch  2 x 15 sec seated     DKTC  2 x 20 secs 2 x 20 sec 3 x 20 sec w ball   TA bracing  4'     Supine march  2 x 10 2 x 10 2 x 10   SLR: B  2 x 5:B 2 x 10; B 2 x 10: B   Clams  10x B 2 z 10;  B    Hip adduction with ball squeeze  10x  2 x 10 Hip abduction  10x B RTB 2 x 10 RTB 2 x 10 in SL; B   Bridges  10x RTB abd 10x extd legs on ball x 10 hold 3 secs   Squat  Sit to stand x 10  10x with 6# ball shoulder press     Lat pull down  2 x10 GTB 2 x 10 GTB    PNF D2 chops   10x B    Nu step    10' L1 for ROM and coordination   Bird dog    10x with cues   Lateral walking    4 x 15ft RTB 4 x 15 ft RTB   Monster walk       Lunges    Fwd & retro x 10; B   BOSU    EO/ECbalance, head rotation x 6'     MODALITIES: (0 minutes): *  Hot Pack Therapy in order to provide analgesia and relieve muscle spasm. (not performed)  [employer] PortalAccess Code: [email or web address]  URL: [email or web address]: 04/29/2021Prepared by: Jennifer Almaraz-Krystalxercises   Supine Lower Trunk Rotation - 2 x daily - 7 x weekly - 10 reps   Hooklying Single Knee to Chest - 2 x daily - 7 x weekly - 3 reps - 20 hold   Supine Posterior Pelvic Tilt - 1 x daily - 7 x weekly - 10 reps - 5 hold   Seated Lumbar Flexion Stretch - 1 x daily - 7 x weekly - 10 reps - 5 hold    Treatment/Session Summary:    · Response to Treatment:  Patient had some back complaints with bird dog  and left knee pain with lunges. · Communication/Consultation:  None today  · Equipment provided today:  None today  · Recommendations/Intent for next treatment session: Next visit will focus on ROM, stretches, core and LE strengthening, and body mechanics training.   Progress as tolerated    Total Treatment Billable Duration:  55 minutes (55 minutes)  PT Patient Time In/Time Out  Time In: 1520  Time Out: 1620  Patricia Galeas PT    Future Appointments   Date Time Provider Fernandez Zheng   5/17/2021  4:00 PM David Arnold, [facility]   5/19/2021  4:00 PM Jennifer Chung, PT [facility]   5/24/2021  3:15 PM Jennifer Chung, PT [facility]   5/26/2021  3:15 PM [facility] Chung, [facility]   6/1/2021  3:15 PM Sheldon[facility]   6/3/2021  4:00 PM Jennifer Chung PT [facility]   6/8/2021  8:30 AM Zoya Grande MD [facility] BSNE   6/9/2021 10:00 AM Pallavi Malik PA [facility] POA   9/7/2021 11:00 AM Abe Barber MD [facility]

## 2021-05-17 ENCOUNTER — HOSPITAL ENCOUNTER (OUTPATIENT)
Dept: PHYSICAL THERAPY | Age: 31
Discharge: HOME | End: 2021-05-17
Payer: MEDICAID

## 2021-05-17 PROCEDURE — 97140 MANUAL THERAPY 1/> REGIONS: CPT

## 2021-05-17 PROCEDURE — 97110 THERAPEUTIC EXERCISES: CPT

## 2021-05-17 NOTE — PROGRESS NOTES
Ayanapaulette Oglesby  : 1969  Payor: Angelita Sole / Plan: [address] HMO/CHOICE PLUS/POS / Product Type: HMO /  [address]  at [facility] KAYCEE SOTELO  [address] [facility], [place], [address], [address] [place] [address]  [phone]   [phone]       OUTPATIENT PHYSICAL THERAPY: Daily Treatment Note 10/2/2019  Visit Count: 4     ICD-10: Treatment Diagnosis: Pain in left shoulder (M25.512); Stiffness of left shoulder, not elsewhere classified (M25.612); Bursitis of left shoulder (M75.52)   Precautions/Allergies: From EMR: Amoxicillin. MD Orders: Eval and Treat; HEP; Strengthening; ROM; \"aggressive strengthening, join as gym member\" (19) MEDICAL/REFERRING DIAGNOSIS: s/p left shoulder EUA Manipulation Arthroscopy ASD ADCR RCR BT   DATE OF ONSET: 19 surgery  REFERRING PHYSICIAN: Jeny Stiles MD  RETURN PHYSICIAN APPOINTMENT: 10/29/19       Pre-treatment Symptoms/Complaints: Shoulder is \"pretty good\". Soreness today after have a pistol shooting lesson at a shooting range. Not too much increase in soreness after last session, but sore yesterday and had trouble getting comfortable to sleep last night. Pain: Initial:   No VAS. Post Session:     Medications Last Reviewed: stared Pennsaid 19    Updated Objective Findings:   No new measure. TREATMENT:     Therapeutic Modalities: (10 Minutes): Moist heat to L shoulder for thermal effects to start treatment, in sitting with arm propped, x10'. Manual Therapies: (10 Minutes):Positional Release technique to tender points in L scapular upper trap, distal pec major, mid deltoid, subscapularis, and biceps for muscle restrictions. L glenohumeral joint mobilization with distraction and long axis traction, grade 2 to 4- oscillations for pain and stiffness.  Shoulder quadrant mobilization grade 2- to 4- -, 3x30\"  Therapeutic Exercise: (30 Minutes): L shoulder motion in supine with assisted Active Isolated Stretching  3\"x10 each, and Pamela Nails  : 1990  Primary: [place] [employer] Medicaid  Secondary:  [facility]  [facility], [address], [place] [address]  [phone]   [phone]      OUTPATIENT PHYSICAL THERAPY: Daily Treatment Note 2021  Visit Count:  5    ICD-10: Treatment Diagnosis: M54.5 Low back pain  M62.81 Weakness  R26.2 Difficulty walking  Precautions/Allergies:   Patient has no known allergies. TREATMENT PLAN:  Effective Dates: 2021 TO 2021 (60 days). Frequency/Duration: 2 times a week for 60 Day(s) MEDICAL/REFERRING DIAGNOSIS:  Chronic back pain, unspecified back location, unspecified back pain laterality [M54.9, G89.29]   DATE OF ONSET:   REFERRING PHYSICIAN: Rivera Boone MD MD Orders: Evaluate and treat  Return MD Appointment: TBD         Pre-treatment Symptoms/Complaints: Mr. Familia Sumner reports having some complaints of left knee pain  Pain: Initial: Pain Intensity 1: 3/10 Post Session:  5/10 post heat   Medications Last Reviewed:  2021  Updated Objective Findings:  None Today  TREATMENT:     THERAPEUTIC EXERCISE: (40 minutes):  Exercises per grid below to improve mobility, strength and coordination. Required minimal visual and tactile cues to promote proper body alignment and promote proper body posture. Progressed resistance, range, repetitions and complexity of movement as indicated. Date:  21 Date:  5/3/21 Date:  21 Date:  5/10/21 Date:  21   Activity/Exercise Parameters Parameters Parameters     SKTC 3 x 20 sec B 3 x 20 secs B 2 x 20 sec 2 x 10 sec  2 x 20 sec; B   LTR 10 10x 10x 10x 10x   Pelvic tilt 10x 10x 10x 10x    Seated lumbar flexion stretch 5 x 10 sec 5 x 10 w ball      HEP 5'       Thoracic rotation stretch  2 x 15 sec seated      DKTC  2 x 20 secs 2 x 20 sec 3 x 20 sec w ball    TA bracing  4'   Arm raise with red SB into curl up x10   Supine march  2 x 10 2 x 10 2 x 10    SLR: B  2 x 5:B 2 x 10; B 2 x 10:  B 2 x 10; B   Clams  10x B 2 x 10; B  2 x 10; B   Hip adduction with ball squeeze  10x  2 x 10     Hip abduction  10x B RTB 2 x 10 RTB 2 x 10 in SL; B 2 x 10 in SL; B   Bridges  10x RTB abd 10x extd legs on ball x 10 hold 3 secs 2 x 10   Squat  Sit to stand x 10  10x with 6# ball shoulder press      Lat pull down  2 x10 GTB 2 x 10 GTB     PNF D2 chops   10x B     Nu step    10' L1 for ROM and coordination    Bird dog    10x with cues    Lateral walking    4 x 15ft RTB 4 x 15 ft RTB    Monster walk        Lunges    Fwd & retro x 10; B    BOSU    EO/ECbalance, head rotation x 6'    Hip flexor stretch isaac test     3 x 30 sec; B   Hamstring stretch with strap     3 x 30 sec: B   Prone quad stretch     3 x 30 sec: B     MANUAL THERAPY: (15 minutes): Joint mobilization and Soft tissue mobilization was utilized and necessary because of the patient's restricted joint motion and restricted motion of soft tissue. STM/MFR of ITB and lateral quads  Patella mobs all glides grade IV  (Medbox PortalAccess Code: [email or web address]  URL: [email or web address]/  Date: 05/17/2021  Prepared by: Manjit Mann    Exercises  Supine Lower Trunk Rotation - 2 x daily - 7 x weekly - 10 reps  Hooklying Single Knee to Chest - 2 x daily - 7 x weekly - 3 reps - 20 hold  Supine Posterior Pelvic Tilt - 1 x daily - 7 x weekly - 10 reps - 5 hold  Seated Lumbar Flexion Stretch - 1 x daily - 7 x weekly - 10 reps - 5 hold  Supine March - 1 x daily - 7 x weekly - 2 sets - 10 reps  Active Straight Leg Raise with Quad Set - 1 x daily - 7 x weekly - 2 sets - 10 reps  Supine Bridge - 1 x daily - 7 x weekly - 2 sets - 10 reps  Hooklying Hamstring Stretch with Strap - 2 x daily - 7 x weekly - 1 sets - 3 reps - 30 hold  Prone Quadriceps Stretch with Strap - 2 x daily - 7 x weekly - 1 sets - 3 reps - 30 hold    Treatment/Session Summary:    · Response to Treatment:  Patient found to be very tight in his quads and ITB with tilting ludin patella.   Did reciprocal inhibition and contract/relax stretching 10\"x6 each to ER at 30, 45, and 80 deg abd, IR at 45 and 80 abd, hold/relax to abd with scapula stabilized 30\"x3; assisted Active Isolate Stretch to ER and IR at 90-deg flexion, horiz abd in 0-deg and 90 deg ER, 3\"x10 each; and hold/relax quadrant stretch 30\"x3; and assisted Active Isolated Stretch to bicep/forearm flexors at 90-deg abd and in modified Median nerve tensioner position, 3\"x10 each. Exercises for shoulder girdle scapulohumeral rhythm and strength with side-lying middle and lower trap rhythmic initiation to active adduction+UE lift off x10 each with instruction for HEP. Good return demonstration after training. Reviewed bilat press with band pull apart in supine and incline sitting. Verbal and manual guiding cues for each of these. HEP: Verbal instruction in the side-lying middle and lower trap moves done today for HEP. Continue existing HEP. She verbalizes understanding. Treatment/Session Summary:    · Response to Treatment: Continues to have tender muscle knots to the shoulder girdle. Pain and stiffness to anterior shoulder capsule. Ranges are still better. Weakness to axioscapular muscles. Good reception to HEP. · Communication/Consultation:  None today  · Equipment provided today:  None today   · Recommendations/Intent for next treatment session: Will continue with L shoulder motion; review HEP; progress breathing and L brachial chain exercises. Treatment Plan of Care Effective Dates:  9/25/2019 TO 11/9/2019 (45 days). Frequency/Duration: 2-3 times a week for 45 Days, then re-assess for need to continue to address discharge goals.       Total Treatment Billable Duration: 50 Minutes  PT Patient Time In/Time Out  Time In: 6485  Time Out: 100 Greta Davis, PT    Future Appointments   Date Time Provider Greta Mares   10/7/2019  2:30 PM [facility]   10/10/2019  1:00 PM Roxanne Ruff, PT [facility]   10/14/2019  1:00 PM Paticia Fatimah, PT [facility]   10/17/2019  1:00 PM Paticia Fatimah, PT [facility]   10/21/2019  1:00 PM Paticia Fatimah, PT [facility]   10/24/2019  1:00 PM Paticia Fatimah, PT [facility]   10/28/2019  1:00 PM Paticia Fatimah, PT [facility]   10/31/2019  1:00 PM Paticia Fatimah, PT [facility]   11/4/2019  1:00 PM Paticia Fatimah, PT [facility]   11/21/2019  8:30 AM PV[facility] P[facility]   12/3/2019 10:50 AM Raul Carlisle PA [facility] PVD well with exercises, discussedd the importance of stretching  to improve flexibility. · Communication/Consultation:  HEP  · Equipment provided today:  HEP  · Recommendations/Intent for next treatment session: Next visit will focus on ROM, stretches, core and LE strengthening, and body mechanics training.   Progress as tolerated    Total Treatment Billable Duration:  55 minutes (15 minutes manual, 40 minutes there ex)  PT Patient Time In/Time Out  Time In: 1600  Time Out: 1700  Clarissa Abdul, CHELSEA    Future Appointments   Date Time Provider Fernandez Hoodi   5/26/2021  3:15 PM Tavon Barakat, PT [facility]   6/1/2021  3:15 PM Jennifer Chung, PT [facility]   6/3/2021  4:00 PM Jennifer Chung, PT [facility]   6/8/2021  8:30 AM Benson Rock MD BSNE BSNE   6/9/2021 10:00 AM Pallavi Malik PA POAG POA   9/7/2021 11:00 AM Garo Lux MD [facility] SAAD

## 2021-05-19 ENCOUNTER — HOSPITAL ENCOUNTER (OUTPATIENT)
Dept: PHYSICAL THERAPY | Age: 31
Discharge: HOME | End: 2021-05-19
Payer: MEDICAID

## 2021-05-19 PROCEDURE — 97110 THERAPEUTIC EXERCISES: CPT

## 2021-05-19 PROCEDURE — 97140 MANUAL THERAPY 1/> REGIONS: CPT

## 2021-05-19 NOTE — PROGRESS NOTES
Chey Brain  : 1990  Primary: Rea Sanon Medicaid  Secondary:  [facility]  [facility], [address], [place] [address]  [phone]   [phone]      OUTPATIENT PHYSICAL THERAPY: Daily Treatment Note 2021  Visit Count:  6    ICD-10: Treatment Diagnosis: M54.5 Low back pain  M62.81 Weakness  R26.2 Difficulty walking  Precautions/Allergies:   Patient has no known allergies. TREATMENT PLAN:  Effective Dates: 2021 TO 2021 (60 days). Frequency/Duration: 2 times a week for 60 Day(s) MEDICAL/REFERRING DIAGNOSIS:  Chronic back pain, unspecified back location, unspecified back pain laterality [M54.9, G89.29]   DATE OF ONSET:   REFERRING PHYSICIAN: Abe Barber MD MD Orders: Evaluate and treat  Return MD Appointment: TBD         Pre-treatment Symptoms/Complaints: Mr. Agnieszka Gomez reports having some complaints of left knee pain  Pain: Initial: Pain Intensity 1: 10 Post Session:  4/10 post heat   Medications Last Reviewed:  2021  Updated Objective Findings:  None Today  TREATMENT:     THERAPEUTIC EXERCISE: (45 minutes):  Exercises per grid below to improve mobility, strength and coordination. Required minimal visual and tactile cues to promote proper body alignment and promote proper body posture. Progressed resistance, range, repetitions and complexity of movement as indicated. Date:  21 Date:  5/3/21 Date:  21 Date:  5/10/21 Date:  21 Date:  21   Activity/Exercise Parameters Parameters Parameters      SKTC 3 x 20 sec B 3 x 20 secs B 2 x 20 sec 2 x 10 sec  2 x 20 sec;B 2 x 20 sec;  B   LTR 10 10x 10x 10x 10x 10   Pelvic tilt 10x 10x 10x 10x  10x   Seated lumbar flexion stretch 5 x 10 sec 5 x 10 w ball       HEP 5'     Munson tape for patella 5'   Thoracic rotation stretch  2 x 15 sec seated       DKTC  2 x 20 secs 2 x 20 sec 3 x 20 sec w ball     TA bracing  4'   Arm raise with red SB into curl up x10    Supine march  2 x 10 2 x 10 2 x 10     SLR: B  2 x 5:B 2 x 10; B 2 x 10: B 2 x 10; B 2 x 10; B   Clams  10x B 2 x 10; B  2 x 10; B 2 x 10: B   Hip adduction with ball squeeze  10x  2 x 10      Hip abduction  10x B RTB 2 x 10 RTB 2 x 10 in SL; B 2 x 10 in SL; B    Bridges  10x RTB abd 10x extd legs on ball x 10 hold 3 secs 2 x 10 2 x 10 with ball squeeze   Squat  Sit to stand x 10  10x with 6# ball shoulder press    10x   Lat pull down  2 x10 GTB 2 x 10 GTB      PNF D2 chops   10x B      Nu step    10' L1 for ROM and coordination  10' L2 for ROM   Bird dog    10x with cues     Lateral walking    4 x 15ft RTB 4 x 15 ft RTB     Monster walk         Lunges    Fwd & retro x 10; B     BOSU    EO/ECbalance, head rotation x 6'     Hip flexor stretch isaac test     3 x 30 sec; B 3 x 30 sec; B   Hamstring stretch with strap     3 x 30 sec: B 3 x 30 sec; B   Prone quad stretch     3 x 30 sec: B 3 x 30 sec; B   Shuttle      dbl leg 2 x 10 100#              MANUAL THERAPY: (10 minutes): Joint mobilization and Soft tissue mobilization was utilized and necessary because of the patient's restricted joint motion and restricted motion of soft tissue. STM/MFR of ITB and lateral quads  Patella mobs all glides grade IV  ([employer]'s Therapy PortalAccess Code: [email or web address]  URL: [email or web address]  Date: 05/17/2021  Prepared by: Lili Hill    Exercises  Supine Lower Trunk Rotation - 2 x daily - 7 x weekly - 10 reps  Hooklying Single Knee to Chest - 2 x daily - 7 x weekly - 3 reps - 20 hold  Supine Posterior Pelvic Tilt - 1 x daily - 7 x weekly - 10 reps - 5 hold  Seated Lumbar Flexion Stretch - 1 x daily - 7 x weekly - 10 reps - 5 hold  Supine March - 1 x daily - 7 x weekly - 2 sets - 10 reps  Active Straight Leg Raise with Quad Set - 1 x daily - 7 x weekly - 2 sets - 10 reps  Supine Bridge - 1 x daily - 7 x weekly - 2 sets - 10 reps  Hooklying Hamstring Stretch with Strap - 2 x daily - 7 x weekly - 1 sets - 3 reps - 30 hold  Prone Quadriceps Stretch with Strap - 2 x daily - 7 x weekly - 1 sets - 3 reps - 30 hold    Treatment/Session Summary:    · Response to Treatment:  Patient tolerated treatment well. Improvmetn in patella discomfort with taping reported. · Communication/Consultation:  Instructed in wear amd reoval of patella tap. Reviewed HEP  · Equipment provided today:  None today  · Recommendations/Intent for next treatment session: Next visit will focus on ROM, stretches, core and LE strengthening, and body mechanics training.   Progress as tolerated    Total Treatment Billable Duration:  55 minutes (10 minutes manual, 45 minutes there ex)  PT Patient Time In/Time Out  Time In: 1600  Time Out: 1700  Darion Jauregui, PT    Future Appointments   Date Time Provider Fernandez Zheng   5/26/2021  3:15 PM Yadira So, PT [facility]   6/1/2021  3:15 PM Jennifer Chung, PT [facility]   6/3/2021  4:00 PM Jennifer Chung, PT [facility]   6/8/2021  8:30 AM Alex Wynne MD BSNE BSNE   6/9/2021 10:00 AM Pallavi Malik PA POAG POA   9/7/2021 11:00 AM Cady Morrow MD [facility]

## 2021-05-24 ENCOUNTER — HOSPITAL ENCOUNTER (OUTPATIENT)
Dept: PHYSICAL THERAPY | Age: 31
Discharge: HOME | End: 2021-05-24
Payer: MEDICAID

## 2021-05-24 PROCEDURE — 97110 THERAPEUTIC EXERCISES: CPT

## 2021-05-25 NOTE — PROGRESS NOTES
Marta Winters  : 1990  Primary: Dwana [place] Medicaid  Secondary:  [facility]  [facility], [address], [place] [address]  [phone]   [phone]      OUTPATIENT PHYSICAL THERAPY: Daily Treatment Note 2021  Visit Count:  7    ICD-10: Treatment Diagnosis: M54.5 Low back pain  M62.81 Weakness  R26.2 Difficulty walking  Precautions/Allergies:   Patient has no known allergies. TREATMENT PLAN:  Effective Dates: 2021 TO 2021 (60 days). Frequency/Duration: 2 times a week for 60 Day(s) MEDICAL/REFERRING DIAGNOSIS:  Chronic back pain, unspecified back location, unspecified back pain laterality [M54.9, G89.29]   DATE OF ONSET:   REFERRING PHYSICIAN: Meeta Del Cid MD MD Orders: Evaluate and treat  Return MD Appointment: TBD         Pre-treatment Symptoms/Complaints: Mr. Yung Left reports his left knee is feeling  Pain: Initial: Pain Intensity 1: 210 Post Session:  3/10   Medications Last Reviewed:  2021  Updated Objective Findings:  None Today  TREATMENT:     THERAPEUTIC EXERCISE: (45 minutes):  Exercises per grid below to improve mobility, strength and coordination. Required minimal visual and tactile cues to promote proper body alignment and promote proper body posture. Progressed resistance, range, repetitions and complexity of movement as indicated. Date:  21 Date:  5/3/21 Date:  21 Date:  5/10/21 Date:  21 Date:  21 Date:  21   Activity/Exercise Parameters Parameters Parameters       SKTC 3 x 20 sec B 3 x 20 secs B 2 x 20 sec 2 x 10 sec  2 x 20 sec;B 2 x 20 sec; B 2 x 20 sec;  B   LTR 10 10x 10x 10x 10x 10 10   Pelvic tilt 10x 10x 10x 10x  10x    Seated lumbar flexion stretch 5 x 10 sec 5 x 10 w ball        HEP 5'     Munson tape for patella 5' Munson tape for patella 5'   Thoracic rotation stretch  2 x 15 sec seated        DKTC  2 x 20 secs 2 x 20 sec 3 x 20 sec w ball      TA bracing  4'   Arm raise with red SB into curl up x10     Supine march  2 x 10 2 x 10 2 x 10   2 x 10   SLR: B  2 x 5:B 2 x 10; B 2 x 10: B 2 x 10; B 2 x 10; B 2 x 10   Clams  10x B 2 x 10; B  2 x 10; B 2 x 10: B    Hip adduction with ball squeeze  10x  2 x 10       Hip abduction  10x B RTB 2 x 10 RTB 2 x 10 in SL; B 2 x 10 in SL; B     Bridges  10x RTB abd 10x extd legs on ball x 10 hold 3 secs 2 x 10 2 x 10 with ball squeeze 2 x 10 with ball squeeze   Squat  Sit to stand x 10  10x with 6# ball shoulder press    10x 10x   Lat pull down  2 x10 GTB 2 x 10 GTB    2 x 10 GTB   PNF D2 chops   10x B    2 x 10 GTB   Nu step    10' L1 for ROM and coordination  10' L2 for ROM 10' L2 for ROM   Bird dog    10x with cues   2 x 10 YTB   Lateral walking    4 x 15ft RTB 4 x 15 ft RTB   2 x 40ft RTB   Monster walk          Lunges    Fwd & retro x 10; B      BOSU    EO/ECbalance, head rotation x 6'      Hip flexor stretch isaac test     3 x 30 sec; B 3 x 30 sec; B 3 x 30 sec; B   Hamstring stretch with strap     3 x 30 sec: B 3 x 30 sec; B 3 x 30 sec; B   Prone quad stretch     3 x 30 sec: B 3 x 30 sec; B 3 x 30 sec; B   Shuttle      dbl leg 2 x 10 100#                MANUAL THERAPY: (0 minutes): Joint mobilization and Soft tissue mobilization was utilized and necessary because of the patient's restricted joint motion and restricted motion of soft tissue. (not performed)   STM/MFR of ITB and lateral quads  Patella mobs all glides grade IV  (V[employer] PortalAccess Code: [email or web address]  URL: [email or web address]  Date: 05/17/2021  Prepared by: Leonidas Slim    Exercises  Supine Lower Trunk Rotation - 2 x daily - 7 x weekly - 10 reps  Hooklying Single Knee to Chest - 2 x daily - 7 x weekly - 3 reps - 20 hold  Supine Posterior Pelvic Tilt - 1 x daily - 7 x weekly - 10 reps - 5 hold  Seated Lumbar Flexion Stretch - 1 x daily - 7 x weekly - 10 reps - 5 hold  Supine March - 1 x daily - 7 x weekly - 2 sets - 10 reps  Active Straight Leg Raise with Quad Set - 1 x daily - 7 x weekly - 2 sets - 10 reps  Supine Bridge - 1 x daily - 7 x weekly - 2 sets - 10 reps  Hooklying Hamstring Stretch with Strap - 2 x daily - 7 x weekly - 1 sets - 3 reps - 30 hold  Prone Quadriceps Stretch with Strap - 2 x daily - 7 x weekly - 1 sets - 3 reps - 30 hold    Treatment/Session Summary:    · Response to Treatment:  Patient did well with exercise activity. Required alot of cues with PNF chop for correct alignment and technique. · Communication/Consultation:  None today  · Equipment provided today:  None today  · Recommendations/Intent for next treatment session: Next visit will focus on ROM, stretches, core and LE strengthening, and body mechanics training.   Progress as tolerated    Total Treatment Billable Duration:  45 minutes (45 minutes there ex)  PT Patient Time In/Time Out  Time In: 1515  Time Out: 1600  Jennifer Larson, CHELSEA    Future Appointments   Date Time Provider Fernandez Zheng   5/26/2021  3:15 PM Dany Herrera, PT [facility]   6/1/2021  3:15 PM [facility]   6/3/2021  4:00 PM Jennifer Chung, PT [facility]   6/8/2021  8:30 AM Rea [place] MD BSNE BSNE   6/9/2021 10:00 AM Pallavi Malik PA POAG POA   9/7/2021 11:00 AM Blanca Bustos MD [facility] SAAD

## 2021-05-26 ENCOUNTER — HOSPITAL ENCOUNTER (OUTPATIENT)
Dept: PHYSICAL THERAPY | Age: 31
Discharge: HOME | End: 2021-05-26
Payer: MEDICAID

## 2021-05-26 PROCEDURE — 97110 THERAPEUTIC EXERCISES: CPT

## 2021-05-26 NOTE — PROGRESS NOTES
Cristo Abts  : 1990  Primary: [place] [employer]  Secondary:  [facility], [address], [place]  [phone]   [phone]      OUTPATIENT PHYSICAL THERAPY: Daily Treatment Note 2021  Visit Count:  8    ICD-10: Treatment Diagnosis: M54.5 Low back pain  M62.81 Weakness  R26.2 Difficulty walking  Precautions/Allergies:   Patient has no known allergies. TREATMENT PLAN:  Effective Dates: 2021 TO 2021 (60 days). Frequency/Duration: 2 times a week for 60 Day(s) MEDICAL/REFERRING DIAGNOSIS:  Chronic back pain, unspecified back location, unspecified back pain laterality [M54.9, G89.29]   DATE OF ONSET:   REFERRING PHYSICIAN: Mario Gutiérrez MD MD Orders: Evaluate and treat  Return MD Appointment: TBD         Pre-treatment Symptoms/Complaints: Mr. Silvia Kaiser reports his left knee is feeling  Pain: Initial: Pain Intensity 1: 3/10 Post Session:  3/10   Medications Last Reviewed:  2021  Updated Objective Findings:  None Today  TREATMENT:     THERAPEUTIC EXERCISE: (45 minutes):  Exercises per grid below to improve mobility, strength and coordination. Required minimal visual and tactile cues to promote proper body alignment and promote proper body posture. Progressed resistance, range, repetitions and complexity of movement as indicated.    Date:  5/3/21 Date:  21 Date:  5/10/21 Date:  21 Date:  21 Date:  21 Date:  21   Activity/Exercise Parameters Parameters        SKTC 3 x 20 secs B 2 x 20 sec 2 x 10 sec  2 x 20 sec;B 2 x 20 sec; B 2 x 20 sec; B 2 x 20 sec;    LTR 10x 10x 10x 10x 10 10 10   Pelvic tilt 10x 10x 10x  10x     Seated lumbar flexion stretch 5 x 10 w ball         HEP     Munson tape for patella 5' Munson tape for patella 5'    Thoracic rotation stretch 2 x 15 sec seated         DKTC 2 x 20 secs 2 x 20 sec 3 x 20 sec w ball    2 x 20 sec   TA bracing 4'   Arm raise with red SB into curl up x10      Supine march 2 x 10 2 x 10 2 x 10   2 x 10    SLR: B 2 x 5:B 2 x 10; B 2 x 10: B 2 x 10; B 2 x 10; B 2 x 10 15x   Clams 10x B 2 x 10; B  2 x 10; B 2 x 10: B     Hip adduction with ball squeeze 10x  2 x 10        Hip abduction 10x B RTB 2 x 10 RTB 2 x 10 in SL; B 2 x 10 in SL; B   15x: B   Bridges 10x RTB abd 10x extd legs on ball x 10 hold 3 secs 2 x 10 2 x 10 with ball squeeze 2 x 10 with ball squeeze 2 x 10 with ball squeeze   Squat Sit to stand x 10  10x with 6# ball shoulder press    10x 10x    Lat pull down 2 x10 GTB 2 x 10 GTB    2 x 10 GTB    PNF D2 chops  10x B    2 x 10 GTB    Nu step   10' L1 for ROM and coordination  10' L2 for ROM 10' L2 for ROM 10' L2 for ROM   Bird dog   10x with cues   2 x 10 YTB    Lateral walking   4 x 15ft RTB 4 x 15 ft RTB   2 x 40ft RTB 2 x 40ft RTB   Monster walk          Lunges   Fwd & retro x 10; B       BOSU   EO/ECbalance, head rotation x 6'       Hip flexor stretch isaac test    3 x 30 sec; B 3 x 30 sec; B 3 x 30 sec; B    Hamstring stretch with strap    3 x 30 sec: B 3 x 30 sec; B 3 x 30 sec; B    Prone quad stretch    3 x 30 sec: B 3 x 30 sec; B 3 x 30 sec; B    Shuttle     dbl leg 2 x 10 100#  Dbl leg x 20 110#  SL x 10 with SLR hold   Single RDL       8x B with UE support and cues     MANUAL THERAPY: (0 minutes): Joint mobilization and Soft tissue mobilization was utilized and necessary because of the patient's restricted joint motion and restricted motion of soft tissue. (not performed)   STM/MFR of ITB and lateral quads  Patella mobs all glides grade IV    MOIST HOT PACK:( 10 minutes): For relaxation and analgesics- no charge  (PacketS[employer] PortalAccess Code: [email or web address]  URL: [email or web address]  Date: 05/17/2021  Prepared by: Paulo Douglass    Exercises  Supine Lower Trunk Rotation - 2 x daily - 7 x weekly - 10 reps  Hooklying Single Knee to Chest - 2 x daily - 7 x weekly - 3 reps - 20 hold  Supine Posterior Pelvic Tilt - 1 x daily - 7 x weekly - 10 reps - 5 hold  Seated Lumbar Flexion Stretch - 1 x daily - 7 x weekly - 10 reps - 5 hold  Supine March - 1 x daily - 7 x weekly - 2 sets - 10 reps  Active Straight Leg Raise with Quad Set - 1 x daily - 7 x weekly - 2 sets - 10 reps  Supine Bridge - 1 x daily - 7 x weekly - 2 sets - 10 reps  Hooklying Hamstring Stretch with Strap - 2 x daily - 7 x weekly - 1 sets - 3 reps - 30 hold  Prone Quadriceps Stretch with Strap - 2 x daily - 7 x weekly - 1 sets - 3 reps - 30 hold    Treatment/Session Summary:    · Response to Treatment:  Patient did well with exercise activity. Required alot of cues with PNF chop for correct alignment and technique. · Communication/Consultation:  None today  · Equipment provided today:  None today  · Recommendations/Intent for next treatment session: Next visit will focus on ROM, stretches, core and LE strengthening, and body mechanics training.   Progress as tolerated    Total Treatment Billable Duration:  45 minutes (45 minutes there ex)  PT Patient Time In/Time Out  Time In: 1515  Time Out: 1610  Titi Grey PT    Future Appointments   Date Time Provider Fernandez Zheng   6/1/2021  3:15 PM Lynnette Larose, PT [facility]   6/3/2021  4:00 PM Jennifer Chung, PT [facility]   6/8/2021  8:30 AM Bert Darling MD [facility]   6/9/2021 10:00 AM Pallavi Malik PA [facility]   9/7/2021 11:00 AM Nunu Huddleston MD [facility]

## 2021-06-03 ENCOUNTER — HOSPITAL ENCOUNTER (OUTPATIENT)
Dept: PHYSICAL THERAPY | Age: 31
Discharge: HOME | End: 2021-06-03

## 2021-06-03 NOTE — PROGRESS NOTES
Aliyah Celestin  : 1990  [place] [facility]  Secondary:  [address] at [facility]  [facility] [address], [place] [address]  [phone]   [phone]      OUTPATIENT DAILY NOTE    NAME/AGE/GENDER: Aliyah Celestin is a 33 y.o. male. DATE: 6/3/2021    Mr. Shelton [place] for today's appointment due to waiting for authorization.     Don Berry, PT   Future Appointments   Date Time Provider [facility]   6/3/2021  7:00 PM Thaddeus Krishna, PT [facility]   2021  8:30 AM Ulysses Morin MD [facility]   2021 10:00 AM Pallavi Malik PA [facility]   2021 11:00 AM Edna De Leon MD [facility]

## 2021-06-08 PROBLEM — Z91.199 NO-SHOW FOR APPOINTMENT: Status: ACTIVE | Noted: 2021-06-08

## 2021-09-01 NOTE — THERAPY EVALUATION
Miky Rater  : 1990  Primary: [place] Medicaid  Secondary:  [address] at [facility]  [address], [address], [place] [address]  [phone]   [phone]           OUTPATIENT PHYSICAL THERAPY:Discontinuation Summary 2021   ICD-10: Treatment Diagnosis: M54.5 Low back pain  M62.81 Weakness  R26.2 Difficulty walking  Precautions/Allergies:   Patient has no known allergies. TREATMENT PLAN:  Effective Dates: 2021 TO 2021 (60 days). Frequency/Duration: 2 times a week for 60 Day(s) MEDICAL/REFERRING DIAGNOSIS:  Lumbago with sciatica, left side [M54.42]  Other chronic pain [G89.29]   DATE OF ONSET:   REFERRING PHYSICIAN: Tangela Thompson MD MD Orders: Evaluate and treat  Return MD Appointment: HEAVEN Bolaños Rater has been seen in physical therapy from 21 to 21 for 8 visits. Treatment has been discontinued at this time due to awating authorization of visits, and new insurance plan. The some goals goals were met prior to discontinuation. Thank you for this referral.     INITIAL ASSESSMENT:  Mr. Mini Aaron presents with complaints of lower back that radiates up to the left paraspinal that radiates into bilateral lower extremities. GOALS: (Goals have been discussed and agreed upon with patient.)  Short-Term Functional Goals: Time Frame: 3 weeks  1. Independent HEP of lumbar and hip stretches (MET)  2. Improve lumbar flexion to 50 degrees to improve bending activities  Discharge Goals: Time Frame: 6 weeks  1. Independent HEP of core and lumbar stabilization exercises  2. Improve ROM to WNL in all planes to allow for greater ease with ADL's (MET)  3. Decrease pain to 2/10 with standing and walking > 45 minutes  4.  Improve Oswestry to 15/50 to allow for minimal restrictions with ADL's    OUTCOME MEASURE:   Tool Used: Modified Oswestry Low Back Pain Questionnaire  Score:  Initial: 36/50  Most Recent:  (Date: -- )   Interpretation of Score: Each section is scored on a 0-5 scale, 5 representing the greatest disability. The scores of each section are added together for a total score of 50. Thank you for this referral,  Jie Juan PT     Referring Physician Signature: Moises Kay MD No Signature is Required for this note.

## 2022-02-02 NOTE — ED NOTES
Pt able to hold arm out without it shaking when asked so that blood pressure cuff could be placed on his arm. Pt states he takes keppra for his seizures. Pt talking in full sentences, when asked if he ever had a seizure where he lost bladder control he stated no that he is able to control that during his seizures. psychiatric evaluation

## 2022-02-27 ENCOUNTER — APPOINTMENT (OUTPATIENT)
Dept: CT IMAGING | Age: 32
End: 2022-02-27
Attending: EMERGENCY MEDICINE
Payer: COMMERCIAL

## 2022-02-27 ENCOUNTER — HOSPITAL ENCOUNTER (EMERGENCY)
Age: 32
Discharge: HOME | End: 2022-02-27
Attending: EMERGENCY MEDICINE
Payer: COMMERCIAL

## 2022-02-27 VITALS
BODY MASS INDEX: 20.25 KG/M2 | HEIGHT: 66 IN | RESPIRATION RATE: 18 BRPM | DIASTOLIC BLOOD PRESSURE: 94 MMHG | WEIGHT: 126 LBS | SYSTOLIC BLOOD PRESSURE: 110 MMHG | OXYGEN SATURATION: 100 % | HEART RATE: 87 BPM | TEMPERATURE: 98.5 F

## 2022-02-27 DIAGNOSIS — R56.9 SEIZURE (HCC): Primary | ICD-10-CM

## 2022-02-27 LAB
ALBUMIN SERPL-MCNC: 3.8 G/DL (ref 3.5–5)
ALBUMIN/GLOB SERPL: 1 {RATIO} (ref 1.2–3.5)
ALP SERPL-CCNC: 45 U/L (ref 50–136)
ALT SERPL-CCNC: 18 U/L (ref 12–65)
ANION GAP SERPL CALC-SCNC: 7 MMOL/L (ref 7–16)
AST SERPL-CCNC: 26 U/L (ref 15–37)
BASOPHILS # BLD: 0 K/UL (ref 0–0.2)
BASOPHILS NFR BLD: 0 % (ref 0–2)
BILIRUB SERPL-MCNC: 0.5 MG/DL (ref 0.2–1.1)
BUN SERPL-MCNC: 7 MG/DL (ref 6–23)
CALCIUM SERPL-MCNC: 8.3 MG/DL (ref 8.3–10.4)
CHLORIDE SERPL-SCNC: 105 MMOL/L (ref 98–107)
CO2 SERPL-SCNC: 27 MMOL/L (ref 21–32)
CREAT SERPL-MCNC: 1 MG/DL (ref 0.8–1.5)
DIFFERENTIAL METHOD BLD: ABNORMAL
EOSINOPHIL # BLD: 0 K/UL (ref 0–0.8)
EOSINOPHIL NFR BLD: 0 % (ref 0.5–7.8)
ERYTHROCYTE [DISTWIDTH] IN BLOOD BY AUTOMATED COUNT: 14.6 % (ref 11.9–14.6)
ETHANOL SERPL-MCNC: 22 MG/DL
GLOBULIN SER CALC-MCNC: 3.7 G/DL (ref 2.3–3.5)
GLUCOSE SERPL-MCNC: 108 MG/DL (ref 65–100)
HCT VFR BLD AUTO: 48.7 % (ref 41.1–50.3)
HGB BLD-MCNC: 15.7 G/DL (ref 13.6–17.2)
IMM GRANULOCYTES # BLD AUTO: 0 K/UL (ref 0–0.5)
IMM GRANULOCYTES NFR BLD AUTO: 0 % (ref 0–5)
LYMPHOCYTES # BLD: 1.3 K/UL (ref 0.5–4.6)
LYMPHOCYTES NFR BLD: 24 % (ref 13–44)
MAGNESIUM SERPL-MCNC: 2.1 MG/DL (ref 1.8–2.4)
MCH RBC QN AUTO: 28.2 PG (ref 26.1–32.9)
MCHC RBC AUTO-ENTMCNC: 32.2 G/DL (ref 31.4–35)
MCV RBC AUTO: 87.6 FL (ref 79.6–97.8)
MONOCYTES # BLD: 0.4 K/UL (ref 0.1–1.3)
MONOCYTES NFR BLD: 7 % (ref 4–12)
NEUTS SEG # BLD: 3.8 K/UL (ref 1.7–8.2)
NEUTS SEG NFR BLD: 68 % (ref 43–78)
NRBC # BLD: 0 K/UL (ref 0–0.2)
PLATELET # BLD AUTO: 231 K/UL (ref 150–450)
PMV BLD AUTO: 10.3 FL (ref 9.4–12.3)
POTASSIUM SERPL-SCNC: 3.8 MMOL/L (ref 3.5–5.1)
PROT SERPL-MCNC: 7.5 G/DL (ref 6.3–8.2)
RBC # BLD AUTO: 5.56 M/UL (ref 4.23–5.6)
SODIUM SERPL-SCNC: 139 MMOL/L (ref 136–145)
WBC # BLD AUTO: 5.6 K/UL (ref 4.3–11.1)

## 2022-02-27 PROCEDURE — 70450 CT HEAD/BRAIN W/O DYE: CPT

## 2022-02-27 PROCEDURE — 82077 ASSAY SPEC XCP UR&BREATH IA: CPT

## 2022-02-27 PROCEDURE — 96366 THER/PROPH/DIAG IV INF ADDON: CPT

## 2022-02-27 PROCEDURE — 94762 N-INVAS EAR/PLS OXIMTRY CONT: CPT

## 2022-02-27 PROCEDURE — 85025 COMPLETE CBC W/AUTO DIFF WBC: CPT

## 2022-02-27 PROCEDURE — 96365 THER/PROPH/DIAG IV INF INIT: CPT

## 2022-02-27 PROCEDURE — 99284 EMERGENCY DEPT VISIT MOD MDM: CPT

## 2022-02-27 PROCEDURE — 74011250636 HC RX REV CODE- 250/636: Performed by: EMERGENCY MEDICINE

## 2022-02-27 PROCEDURE — 74011000258 HC RX REV CODE- 258: Performed by: EMERGENCY MEDICINE

## 2022-02-27 PROCEDURE — 83735 ASSAY OF MAGNESIUM: CPT

## 2022-02-27 PROCEDURE — 74011000250 HC RX REV CODE- 250: Performed by: EMERGENCY MEDICINE

## 2022-02-27 PROCEDURE — 80053 COMPREHEN METABOLIC PANEL: CPT

## 2022-02-27 RX ORDER — SODIUM CHLORIDE 0.9 % (FLUSH) 0.9 %
5-10 SYRINGE (ML) INJECTION EVERY 8 HOURS
Status: DISCONTINUED | OUTPATIENT
Start: 2022-02-27 | End: 2022-02-27 | Stop reason: HOSPADM

## 2022-02-27 RX ORDER — SODIUM CHLORIDE 0.9 % (FLUSH) 0.9 %
5-10 SYRINGE (ML) INJECTION AS NEEDED
Status: DISCONTINUED | OUTPATIENT
Start: 2022-02-27 | End: 2022-02-27 | Stop reason: HOSPADM

## 2022-02-27 RX ADMIN — LEVETIRACETAM 1000 MG: 100 INJECTION, SOLUTION INTRAVENOUS at 02:27

## 2022-02-27 RX ADMIN — SODIUM CHLORIDE, PRESERVATIVE FREE 10 ML: 5 INJECTION INTRAVENOUS at 02:26

## 2022-02-27 NOTE — ED TRIAGE NOTES
Pt states ETOH use this evening while shooting pool. Pt is reluctant to say how much ETOH he used and reluctant to answer the question about illicit drug use.  Pt does have history of schizophrenia per EMS

## 2022-02-27 NOTE — DISCHARGE INSTRUCTIONS
Follow-up with your doctor, return the emergency department if your symptoms worsen. Continue taking your Keppra as prescribed.
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
## 2022-02-27 NOTE — ED TRIAGE NOTES
Pt to ED via [facility] for a seizure that lasted approx 2 minutes. Pt states having a seizure earlier this month and states taking Keppra and not taking it today. Pt states ETOH use today while shooting pool and states rightsided head pain and right shoulder pain. Pt states he felt the seizure coming but was unable to get to a safe spot so he believes he fell.        HR 90  /62    TEMP not recorded

## 2022-02-27 NOTE — ED NOTES

## 2022-03-19 PROBLEM — Z91.199 NO-SHOW FOR APPOINTMENT: Status: ACTIVE | Noted: 2021-06-08

## 2022-03-19 PROBLEM — Z86.59 HISTORY OF SCHIZOPHRENIA: Status: ACTIVE | Noted: 2021-02-04

## 2022-03-19 PROBLEM — Z86.59 HISTORY OF BIPOLAR DISORDER: Status: ACTIVE | Noted: 2021-02-04

## 2022-03-19 PROBLEM — F12.90 MARIJUANA USER: Status: ACTIVE | Noted: 2021-02-04

## 2022-03-20 PROBLEM — M54.50 CHRONIC BILATERAL LOW BACK PAIN WITHOUT SCIATICA: Status: ACTIVE | Noted: 2021-03-07

## 2022-03-20 PROBLEM — G89.29 CHRONIC BILATERAL LOW BACK PAIN WITHOUT SCIATICA: Status: ACTIVE | Noted: 2021-03-07

## 2022-11-05 ENCOUNTER — HOSPITAL ENCOUNTER (EMERGENCY)
Age: 32
Discharge: HOME | End: 2022-11-05

## 2022-11-05 PROCEDURE — 4500000002 HC ER NO CHARGE

## 2023-03-07 ENCOUNTER — OFFICE VISIT (OUTPATIENT)
Dept: INTERNAL MEDICINE CLINIC | Facility: CLINIC | Age: 33
End: 2023-03-07
Payer: MEDICAID

## 2023-03-07 VITALS
HEART RATE: 72 BPM | OXYGEN SATURATION: 97 % | DIASTOLIC BLOOD PRESSURE: 72 MMHG | SYSTOLIC BLOOD PRESSURE: 128 MMHG | BODY MASS INDEX: 20.25 KG/M2 | WEIGHT: 118 LBS

## 2023-03-07 DIAGNOSIS — R22.32 LOCALIZED SWELLING OF LEFT THUMB: Primary | ICD-10-CM

## 2023-03-07 PROCEDURE — 99213 OFFICE O/P EST LOW 20 MIN: CPT | Performed by: INTERNAL MEDICINE

## 2023-03-07 RX ORDER — OLANZAPINE 5 MG/1
TABLET ORAL
COMMUNITY
Start: 2023-02-28

## 2023-03-07 SDOH — ECONOMIC STABILITY: FOOD INSECURITY: WITHIN THE PAST 12 MONTHS, YOU WORRIED THAT YOUR FOOD WOULD RUN OUT BEFORE YOU GOT MONEY TO BUY MORE.: NEVER TRUE

## 2023-03-07 SDOH — ECONOMIC STABILITY: HOUSING INSECURITY
IN THE LAST 12 MONTHS, WAS THERE A TIME WHEN YOU DID NOT HAVE A STEADY PLACE TO SLEEP OR SLEPT IN A SHELTER (INCLUDING NOW)?: NO

## 2023-03-07 SDOH — ECONOMIC STABILITY: INCOME INSECURITY: HOW HARD IS IT FOR YOU TO PAY FOR THE VERY BASICS LIKE FOOD, HOUSING, MEDICAL CARE, AND HEATING?: NOT HARD AT ALL

## 2023-03-07 SDOH — ECONOMIC STABILITY: FOOD INSECURITY: WITHIN THE PAST 12 MONTHS, THE FOOD YOU BOUGHT JUST DIDN'T LAST AND YOU DIDN'T HAVE MONEY TO GET MORE.: NEVER TRUE

## 2023-03-07 ASSESSMENT — PATIENT HEALTH QUESTIONNAIRE - PHQ9
SUM OF ALL RESPONSES TO PHQ QUESTIONS 1-9: 0
SUM OF ALL RESPONSES TO PHQ9 QUESTIONS 1 & 2: 0
SUM OF ALL RESPONSES TO PHQ QUESTIONS 1-9: 0
2. FEELING DOWN, DEPRESSED OR HOPELESS: 0
SUM OF ALL RESPONSES TO PHQ QUESTIONS 1-9: 0
1. LITTLE INTEREST OR PLEASURE IN DOING THINGS: 0
SUM OF ALL RESPONSES TO PHQ QUESTIONS 1-9: 0

## 2023-03-07 ASSESSMENT — ENCOUNTER SYMPTOMS
COUGH: 0
SHORTNESS OF BREATH: 0
ABDOMINAL PAIN: 0
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
## 2024-03-13 ENCOUNTER — TELEPHONE (OUTPATIENT)
Dept: INTERNAL MEDICINE CLINIC | Facility: CLINIC | Age: 34
End: 2024-03-13

## 2024-03-13 NOTE — TELEPHONE ENCOUNTER
Called pt to check and see why he no showed his appt. Was not able to get through due to phone being disconnected.

## 2024-03-20 ENCOUNTER — TELEPHONE (OUTPATIENT)
Dept: INTERNAL MEDICINE CLINIC | Facility: CLINIC | Age: 34
End: 2024-03-20

## 2024-03-20 NOTE — TELEPHONE ENCOUNTER
Called pt multiple times and left v/m to cb office in regards to appt tomorrow, pt needs to be r/s'd for Friday at 11:45 am.

## 2024-09-02 ENCOUNTER — HOSPITAL ENCOUNTER (EMERGENCY)
Age: 34
Discharge: LEFT BEFORE DISCHARGE | End: 2024-09-02
Attending: EMERGENCY MEDICINE
Payer: MEDICARE

## 2024-09-02 VITALS
HEIGHT: 68 IN | WEIGHT: 130 LBS | SYSTOLIC BLOOD PRESSURE: 112 MMHG | DIASTOLIC BLOOD PRESSURE: 73 MMHG | HEART RATE: 80 BPM | TEMPERATURE: 97.7 F | BODY MASS INDEX: 19.7 KG/M2 | RESPIRATION RATE: 18 BRPM | OXYGEN SATURATION: 99 %
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
## 2024-09-02 PROCEDURE — 99283 EMERGENCY DEPT VISIT LOW MDM: CPT

## 2024-09-02 PROCEDURE — 6370000000 HC RX 637 (ALT 250 FOR IP): Performed by: EMERGENCY MEDICINE

## 2024-09-02 RX ORDER — HYDROCODONE BITARTRATE AND ACETAMINOPHEN 5; 325 MG/1; MG/1
1 TABLET ORAL
Status: COMPLETED | OUTPATIENT
Start: 2024-09-02 | End: 2024-09-02

## 2024-09-02 RX ORDER — IBUPROFEN 600 MG/1
600 TABLET, FILM COATED ORAL
Status: COMPLETED | OUTPATIENT
Start: 2024-09-02 | End: 2024-09-02

## 2024-09-02 RX ADMIN — IBUPROFEN 600 MG: 600 TABLET, FILM COATED ORAL at 19:23

## 2024-09-02 RX ADMIN — HYDROCODONE BITARTRATE AND ACETAMINOPHEN 1 TABLET: 5; 325 TABLET ORAL at 19:23

## 2024-09-02 ASSESSMENT — LIFESTYLE VARIABLES
HOW MANY STANDARD DRINKS CONTAINING ALCOHOL DO YOU HAVE ON A TYPICAL DAY: PATIENT DOES NOT DRINK
HOW OFTEN DO YOU HAVE A DRINK CONTAINING ALCOHOL: MONTHLY OR LESS

## 2024-09-02 ASSESSMENT — PAIN SCALES - GENERAL
PAINLEVEL_OUTOF10: 6
PAINLEVEL_OUTOF10: 5

## 2024-09-02 ASSESSMENT — PAIN DESCRIPTION - LOCATION: LOCATION: FOOT

## 2024-09-02 ASSESSMENT — PAIN - FUNCTIONAL ASSESSMENT: PAIN_FUNCTIONAL_ASSESSMENT: 0-10

## 2024-09-02 ASSESSMENT — PAIN DESCRIPTION - ORIENTATION: ORIENTATION: RIGHT;LEFT

## 2024-09-02 ASSESSMENT — PAIN DESCRIPTION - DESCRIPTORS: DESCRIPTORS: BURNING
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
## 2025-04-25 ENCOUNTER — HOSPITAL ENCOUNTER (EMERGENCY)
Age: 35
Discharge: HOME | End: 2025-04-25
Attending: EMERGENCY MEDICINE
Payer: MEDICARE

## 2025-04-25 VITALS
SYSTOLIC BLOOD PRESSURE: 103 MMHG | TEMPERATURE: 97.5 F | BODY MASS INDEX: 21.69 KG/M2 | HEART RATE: 74 BPM | WEIGHT: 135 LBS | OXYGEN SATURATION: 99 % | DIASTOLIC BLOOD PRESSURE: 54 MMHG | RESPIRATION RATE: 16 BRPM | HEIGHT: 66 IN

## 2025-04-25 DIAGNOSIS — D62 ACUTE BLOOD LOSS ANEMIA: ICD-10-CM

## 2025-04-25 DIAGNOSIS — S81.811A LACERATION OF RIGHT LOWER EXTREMITY, INITIAL ENCOUNTER: Primary | ICD-10-CM

## 2025-04-25 LAB
ANION GAP SERPL CALC-SCNC: 9 MMOL/L (ref 7–16)
BASOPHILS # BLD: 0.02 K/UL (ref 0–0.2)
BASOPHILS NFR BLD: 0.2 % (ref 0–2)
BUN SERPL-MCNC: 10 MG/DL (ref 6–23)
CALCIUM SERPL-MCNC: 8.9 MG/DL (ref 8.8–10.2)
CHLORIDE SERPL-SCNC: 105 MMOL/L (ref 98–107)
CO2 SERPL-SCNC: 26 MMOL/L (ref 20–29)
CREAT SERPL-MCNC: 1.09 MG/DL (ref 0.8–1.3)
DIFFERENTIAL METHOD BLD: ABNORMAL
EOSINOPHIL # BLD: 0.08 K/UL (ref 0–0.8)
EOSINOPHIL NFR BLD: 1 % (ref 0.5–7.8)
ERYTHROCYTE [DISTWIDTH] IN BLOOD BY AUTOMATED COUNT: 14.9 % (ref 11.9–14.6)
GLUCOSE SERPL-MCNC: 114 MG/DL (ref 70–99)
HCT VFR BLD AUTO: 32.4 % (ref 41.1–50.3)
HGB BLD-MCNC: 10.4 G/DL (ref 13.6–17.2)
IMM GRANULOCYTES # BLD AUTO: 0.02 K/UL (ref 0–0.5)
IMM GRANULOCYTES NFR BLD AUTO: 0.2 % (ref 0–5)
LYMPHOCYTES # BLD: 1.74 K/UL (ref 0.5–4.6)
LYMPHOCYTES NFR BLD: 21.1 % (ref 13–44)
MCH RBC QN AUTO: 26.1 PG (ref 26.1–32.9)
MCHC RBC AUTO-ENTMCNC: 32.1 G/DL (ref 31.4–35)
MCV RBC AUTO: 81.2 FL (ref 82–102)
MONOCYTES # BLD: 0.53 K/UL (ref 0.1–1.3)
MONOCYTES NFR BLD: 6.4 % (ref 4–12)
NEUTS SEG # BLD: 5.86 K/UL (ref 1.7–8.2)
NEUTS SEG NFR BLD: 71.1 % (ref 43–78)
NRBC # BLD: 0 K/UL (ref 0–0.2)
PLATELET # BLD AUTO: 212 K/UL (ref 150–450)
PMV BLD AUTO: 9.9 FL (ref 9.4–12.3)
POTASSIUM SERPL-SCNC: 5 MMOL/L (ref 3.5–5.1)
RBC # BLD AUTO: 3.99 M/UL (ref 4.23–5.6)
SODIUM SERPL-SCNC: 140 MMOL/L (ref 136–145)
WBC # BLD AUTO: 8.3 K/UL (ref 4.3–11.1)

## 2025-04-25 PROCEDURE — 90714 TD VACC NO PRESV 7 YRS+ IM: CPT | Performed by: EMERGENCY MEDICINE

## 2025-04-25 PROCEDURE — 90471 IMMUNIZATION ADMIN: CPT | Performed by: EMERGENCY MEDICINE

## 2025-04-25 PROCEDURE — 80048 BASIC METABOLIC PNL TOTAL CA: CPT

## 2025-04-25 PROCEDURE — 85025 COMPLETE CBC W/AUTO DIFF WBC: CPT

## 2025-04-25 PROCEDURE — 87661 TRICHOMONAS VAGINALIS AMPLIF: CPT

## 2025-04-25 PROCEDURE — 87491 CHLMYD TRACH DNA AMP PROBE: CPT

## 2025-04-25 PROCEDURE — 12002 RPR S/N/AX/GEN/TRNK2.6-7.5CM: CPT

## 2025-04-25 PROCEDURE — 96372 THER/PROPH/DIAG INJ SC/IM: CPT

## 2025-04-25 PROCEDURE — 99284 EMERGENCY DEPT VISIT MOD MDM: CPT

## 2025-04-25 PROCEDURE — 87591 N.GONORRHOEAE DNA AMP PROB: CPT

## 2025-04-25 PROCEDURE — 6360000002 HC RX W HCPCS: Performed by: EMERGENCY MEDICINE

## 2025-04-25 RX ORDER — LIDOCAINE HYDROCHLORIDE AND EPINEPHRINE 10; 10 MG/ML; UG/ML
20 INJECTION, SOLUTION INFILTRATION; PERINEURAL ONCE
Status: COMPLETED | OUTPATIENT
Start: 2025-04-25 | End: 2025-04-25

## 2025-04-25 RX ORDER — MORPHINE SULFATE 4 MG/ML
4 INJECTION, SOLUTION INTRAMUSCULAR; INTRAVENOUS ONCE
Status: COMPLETED | OUTPATIENT
Start: 2025-04-25 | End: 2025-04-25

## 2025-04-25 RX ADMIN — LIDOCAINE HYDROCHLORIDE,EPINEPHRINE BITARTRATE 20 ML: 10; .01 INJECTION, SOLUTION INFILTRATION; PERINEURAL at 19:38

## 2025-04-25 RX ADMIN — MORPHINE SULFATE 4 MG: 4 INJECTION INTRAVENOUS at 19:39

## 2025-04-25 RX ADMIN — CLOSTRIDIUM TETANI TOXOID ANTIGEN (FORMALDEHYDE INACTIVATED) AND CORYNEBACTERIUM DIPHTHERIAE TOXOID ANTIGEN (FORMALDEHYDE INACTIVATED) 0.5 ML: 5; 2 INJECTION, SUSPENSION INTRAMUSCULAR at 19:39

## 2025-04-25 ASSESSMENT — PAIN SCALES - GENERAL: PAINLEVEL_OUTOF10: 4

## 2025-04-25 ASSESSMENT — PAIN DESCRIPTION - LOCATION: LOCATION: LEG

## 2025-04-25 ASSESSMENT — PAIN - FUNCTIONAL ASSESSMENT: PAIN_FUNCTIONAL_ASSESSMENT: 0-10

## 2025-04-25 NOTE — ED TRIAGE NOTES
Pt states he has a cut on his R upper thigh. Pt states he was working at [employer] when he was using a knife and he accidentally sliced his thigh. States he has it wrapped under his pants.States it's about 3-4 inches long. States it happened about 6 hours ago. Says he was at [employer] but left because it was taking too long. States they did not do any stitches before he left. Pt c/o increased bleeding to site but no blood observed on pants.

## 2025-04-26 NOTE — ED PROVIDER NOTES
Emergency Department Provider Note       [facility]Y DEPT   PCP: Unknown, Provider   Age: 35 y.o.   Sex: male     DISPOSITION Decision To Discharge 04/25/2025 08:18:26 PM    ICD-10-CM    1. Laceration of right lower extremity, initial encounter  S81.811A       2. Acute blood loss anemia  D62           Medical Decision Making     8:20 PM EDT  Wound was anesthetized and fully explored, there is no evidence of tendon injury or large vascular injury.  After anesthesia with lidocaine with epinephrine the bleeding did slow significantly though there was not still present a slight small ooze.  The wound was closed with 3-0 Prolene running stitch given that it was gaping under tension.  The wound closed easily.  Tetanus was updated. Given that the patient was having some lightheadedness and has now been bleeding for approximately 7 hours, will check his hemoglobin.  Wound is overall clean, no evidence of contamination, I do not think any antibiotics are indicated at this time.    10:26 PM EDT  Patient's hemoglobin did drop from 15 to10, however he has no vital sign changes, he is generally asymptomatic, and does not require transfusion at this time.  I have asked him to follow-up to occupational health where he may require repeat lab tests to ensure that his hemoglobin is normalizing  1 acute illness with systemic symptoms.  Shared medical decision making was utilized in creating the patients health plan today.  I independently ordered and reviewed each unique test.                         History     35-year-old male presented to the emergency department for a laceration of his right leg just proximal to his knee.  Patient states he was at work cutting boxes with a large knife when he accidentally cut his leg.  He states that he had immediate onset of bleeding, he notes he had quite a bit of bleeding and went around 1 or 2 PM over to [facility].  He states that he waited in the lobby for over 4 hours  Smokeless tobacco: Never   Substance and Sexual Activity    Alcohol use: Not Currently    Drug use: No     Social Drivers of Health     Financial Resource Strain: Low Risk  (3/7/2023)    Overall Financial Resource Strain (CARDIA)     Difficulty of Paying Living Expenses: Not hard at all   Transportation Needs: Unknown (3/7/2023)    PRAPARE - Transportation     Lack of Transportation (Non-Medical): No   Social Connections: Unknown (3/19/2021)    Received from [facility]    Social Connections     Frequency of Communication with Friends and Family: Not asked     Frequency of Social Gatherings with Friends and Family: Not asked   Intimate Partner Violence: Unknown (3/19/2021)    Received from [facility]    Intimate Partner Violence     Fear of Current or Ex-Partner: Not asked     Emotionally Abused: Not asked     Physically Abused: Not asked     Sexually Abused: Not asked   Housing Stability: Unknown (3/7/2023)    Housing Stability Vital Sign     Unstable Housing in the Last Year: No        Previous Medications    BENZTROPINE (COGENTIN) 1 MG TABLET    TAKE 1 TABLET BY MOUTH AT BEDTIME    DIVALPROEX (DEPAKOTE) 500 MG DR TABLET    Take 1,000 mg by mouth 2 times daily    ERGOCALCIFEROL (ERGOCALCIFEROL) 1.25 MG (17918 UT) CAPSULE    Take 50,000 Units by mouth every 7 days    NAPROXEN (NAPROSYN) 500 MG TABLET    Take 1 tablet by mouth 2 times daily (with meals) for 20 days    OLANZAPINE (ZYPREXA) 5 MG TABLET            Results from this emergency department visit:      Results for orders placed or performed during the hospital encounter of 04/25/25   BMP   Result Value Ref Range    Sodium 140 136 - 145 mmol/L    Potassium 5.0 3.5 - 5.1 mmol/L    Chloride 105 98 - 107 mmol/L    CO2 26 20 - 29 mmol/L    Anion Gap 9 7 - 16 mmol/L    Glucose 114 (H) 70 - 99 mg/dL    BUN 10 6 - 23 MG/DL    Creatinine 1.09 0.80 - 1.30 MG/DL    Est, Glom Filt Rate 90 >60 ml/min/1.73m2    Calcium 8.9 8.8 - 10.2 MG/DL   CBC with Auto

## 2025-04-26 NOTE — DISCHARGE INSTRUCTIONS
Your laceration was repaired with stitches.  Your tetanus has been updated.  The stitches will need to come out in 10 to 14 days.  Remember that the skin does not have full strength for closer to 3 weeks.  You will need to protect the wound.  You can dress it daily with antibiotic ointment    You did lose a significant amount of blood, and while you do not require blood transfusion it may take a couple of weeks to months for you to fully replenish the blood that you have lost.  You may need to have a repeat lab test to ensure that it is returning to normal.  Occupational health can help arrange this for you.

## 2025-04-28 LAB
C TRACH RRNA SPEC QL NAA+PROBE: NEGATIVE
N GONORRHOEA RRNA SPEC QL NAA+PROBE: NEGATIVE
SPECIMEN SOURCE: NORMAL
T VAGINALIS RRNA SPEC QL NAA+PROBE: NEGATIVE

## 2025-08-14 ENCOUNTER — HOSPITAL ENCOUNTER (EMERGENCY)
Age: 35
Discharge: HOME | End: 2025-08-14
Attending: EMERGENCY MEDICINE
Payer: MEDICARE

## 2025-08-14 ENCOUNTER — APPOINTMENT (OUTPATIENT)
Dept: NON INVASIVE DIAGNOSTICS | Age: 35
End: 2025-08-14
Payer: MEDICARE

## 2025-08-14 ENCOUNTER — HOSPITAL ENCOUNTER (INPATIENT)
Age: 35
LOS: 1 days | Discharge: HOME | End: 2025-08-15
Attending: INTERNAL MEDICINE | Admitting: INTERNAL MEDICINE
Payer: MEDICARE

## 2025-08-14 ENCOUNTER — APPOINTMENT (OUTPATIENT)
Dept: GENERAL RADIOLOGY | Age: 35
End: 2025-08-14
Payer: MEDICARE

## 2025-08-14 VITALS
WEIGHT: 140 LBS | OXYGEN SATURATION: 96 % | RESPIRATION RATE: 20 BRPM | BODY MASS INDEX: 22.5 KG/M2 | SYSTOLIC BLOOD PRESSURE: 129 MMHG | HEIGHT: 66 IN | TEMPERATURE: 98.3 F | HEART RATE: 71 BPM | DIASTOLIC BLOOD PRESSURE: 68 MMHG

## 2025-08-14 DIAGNOSIS — I21.3 STEMI (ST ELEVATION MYOCARDIAL INFARCTION) (HCC): ICD-10-CM

## 2025-08-14 DIAGNOSIS — I21.3 ST ELEVATION MYOCARDIAL INFARCTION (STEMI), UNSPECIFIED ARTERY (HCC): Primary | ICD-10-CM

## 2025-08-14 DIAGNOSIS — I21.3 ACUTE ST ELEVATION MYOCARDIAL INFARCTION (STEMI), UNSPECIFIED ARTERY (HCC): Primary | ICD-10-CM

## 2025-08-14 LAB
ALBUMIN SERPL-MCNC: 4.1 G/DL (ref 3.5–5)
ALBUMIN/GLOB SERPL: 1.1 (ref 1–1.9)
ALP SERPL-CCNC: 117 U/L (ref 40–129)
ALT SERPL-CCNC: 10 U/L (ref 8–55)
AMPHET UR QL SCN: NEGATIVE
ANION GAP SERPL CALC-SCNC: 12 MMOL/L (ref 7–16)
AST SERPL-CCNC: 29 U/L (ref 15–37)
BARBITURATES UR QL SCN: NEGATIVE
BASOPHILS # BLD: 0.03 K/UL (ref 0–0.2)
BASOPHILS NFR BLD: 0.3 % (ref 0–2)
BENZODIAZ UR QL: POSITIVE
BILIRUB SERPL-MCNC: <0.2 MG/DL (ref 0–1.2)
BUN SERPL-MCNC: 12 MG/DL (ref 6–23)
CALCIUM SERPL-MCNC: 8.9 MG/DL (ref 8.8–10.2)
CANNABINOIDS UR QL SCN: POSITIVE
CHLORIDE SERPL-SCNC: 104 MMOL/L (ref 98–107)
CO2 SERPL-SCNC: 23 MMOL/L (ref 20–29)
COCAINE UR QL SCN: NEGATIVE
CREAT SERPL-MCNC: 1.11 MG/DL (ref 0.8–1.3)
CRP SERPL HS-MCNC: 2.3 MG/L (ref 0–3)
D DIMER PPP FEU-MCNC: 0.39 UG/ML(FEU)
DIFFERENTIAL METHOD BLD: ABNORMAL
ECHO AO ASC DIAM: 2.6 CM
ECHO AO ASCENDING AORTA INDEX: 1.51 CM/M2
ECHO AV MEAN GRADIENT: 3 MMHG
ECHO AV MEAN VELOCITY: 0.8 M/S
ECHO AV PEAK GRADIENT: 6 MMHG
ECHO AV PEAK GRADIENT: 6 MMHG
ECHO AV PEAK VELOCITY: 1.2 M/S
ECHO AV VELOCITY RATIO: 0.67
ECHO AV VTI: 22.6 CM
ECHO BSA: 1.72 M2
ECHO BSA: 1.72 M2
ECHO EST RA PRESSURE: 3 MMHG
ECHO LA AREA 2C: 12.2 CM2
ECHO LA AREA 4C: 11.7 CM2
ECHO LA DIAMETER INDEX: 1.69 CM/M2
ECHO LA DIAMETER: 2.9 CM
ECHO LA MAJOR AXIS: 4.7 CM
ECHO LA MINOR AXIS: 4.3 CM
ECHO LA VOL BP: 27 ML (ref 18–58)
ECHO LA VOL MOD A2C: 29 ML (ref 18–58)
ECHO LA VOL MOD A4C: 22 ML (ref 18–58)
ECHO LA VOL/BSA BIPLANE: 16 ML/M2 (ref 16–34)
ECHO LA VOLUME INDEX MOD A2C: 17 ML/M2 (ref 16–34)
ECHO LA VOLUME INDEX MOD A4C: 13 ML/M2 (ref 16–34)
ECHO LV E' LATERAL VELOCITY: 10.6 CM/S
ECHO LV E' SEPTAL VELOCITY: 8.59 CM/S
ECHO LV EDV A2C: 54 ML
ECHO LV EDV A4C: 58 ML
ECHO LV EDV INDEX A4C: 34 ML/M2
ECHO LV EDV NDEX A2C: 31 ML/M2
ECHO LV EF PHYSICIAN: 55 %
ECHO LV EJECTION FRACTION A2C: 52 %
ECHO LV EJECTION FRACTION A4C: 54 %
ECHO LV EJECTION FRACTION BIPLANE: 53 % (ref 55–100)
ECHO LV ESV A2C: 26 ML
ECHO LV ESV A4C: 26 ML
ECHO LV ESV INDEX A2C: 15 ML/M2
ECHO LV ESV INDEX A4C: 15 ML/M2
ECHO LV FRACTIONAL SHORTENING: 33 % (ref 28–44)
ECHO LV INTERNAL DIMENSION DIASTOLE INDEX: 2.44 CM/M2
ECHO LV INTERNAL DIMENSION DIASTOLIC: 4.2 CM (ref 4.2–5.9)
ECHO LV INTERNAL DIMENSION SYSTOLIC INDEX: 1.63 CM/M2
ECHO LV INTERNAL DIMENSION SYSTOLIC: 2.8 CM
ECHO LV IVSD: 0.8 CM (ref 0.6–1)
ECHO LV MASS 2D: 118.7 G (ref 88–224)
ECHO LV MASS INDEX 2D: 69 G/M2 (ref 49–115)
ECHO LV POSTERIOR WALL DIASTOLIC: 1 CM (ref 0.6–1)
ECHO LV RELATIVE WALL THICKNESS RATIO: 0.48
ECHO LVOT AV VTI INDEX: 0.74
ECHO LVOT MEAN GRADIENT: 1 MMHG
ECHO LVOT PEAK GRADIENT: 3 MMHG
ECHO LVOT PEAK VELOCITY: 0.8 M/S
ECHO LVOT VTI: 16.8 CM
ECHO MV A VELOCITY: 0.66 M/S
ECHO MV E DECELERATION TIME (DT): 187 MS
ECHO MV E VELOCITY: 0.57 M/S
ECHO MV E/A RATIO: 0.86
ECHO MV E/E' LATERAL: 5.38
ECHO MV E/E' RATIO (AVERAGED): 6.01
ECHO MV E/E' SEPTAL: 6.64
ECHO MV LVOT VTI INDEX: 1.53
ECHO MV MAX VELOCITY: 0.8 M/S
ECHO MV MEAN GRADIENT: 1 MMHG
ECHO MV MEAN VELOCITY: 0.5 M/S
ECHO MV PEAK GRADIENT: 3 MMHG
ECHO MV VTI: 25.7 CM
ECHO PV ACCELERATION TIME (AT): 135 MS
ECHO PV MAX VELOCITY: 0.9 M/S
ECHO PV PEAK GRADIENT: 3 MMHG
ECHO RIGHT VENTRICULAR SYSTOLIC PRESSURE (RVSP): 15 MMHG
ECHO RV BASAL DIMENSION: 3.3 CM
ECHO RV FREE WALL PEAK S': 9.1 CM/S
ECHO TV REGURGITANT MAX VELOCITY: 1.71 M/S
ECHO TV REGURGITANT PEAK GRADIENT: 12 MMHG
ECHO TV REGURGITANT PEAK GRADIENT: 12 MMHG
EKG ATRIAL RATE: 69 BPM
EKG DIAGNOSIS: NORMAL
EKG P AXIS: 43 DEGREES
EKG P-R INTERVAL: 146 MS
EKG Q-T INTERVAL: 368 MS
EKG QRS DURATION: 86 MS
EKG QTC CALCULATION (BAZETT): 394 MS
EKG R AXIS: 70 DEGREES
EKG T AXIS: -7 DEGREES
EKG VENTRICULAR RATE: 69 BPM
EOSINOPHIL # BLD: 0.09 K/UL (ref 0–0.8)
EOSINOPHIL NFR BLD: 0.8 % (ref 0.5–7.8)
ERYTHROCYTE [DISTWIDTH] IN BLOOD BY AUTOMATED COUNT: 16.6 % (ref 11.9–14.6)
ERYTHROCYTE [SEDIMENTATION RATE] IN BLOOD: 2 MM/HR (ref 0–20)
FENTANYL: POSITIVE
GLOBULIN SER CALC-MCNC: 3.7 G/DL (ref 2.3–3.5)
GLUCOSE SERPL-MCNC: 90 MG/DL (ref 70–99)
HCT VFR BLD AUTO: 39.3 % (ref 41.1–50.3)
HGB BLD-MCNC: 11.7 G/DL (ref 13.6–17.2)
IMM GRANULOCYTES # BLD AUTO: 0.02 K/UL (ref 0–0.5)
IMM GRANULOCYTES NFR BLD AUTO: 0.2 % (ref 0–5)
LYMPHOCYTES # BLD: 1.78 K/UL (ref 0.5–4.6)
LYMPHOCYTES NFR BLD: 15.7 % (ref 13–44)
Lab: ABNORMAL
MCH RBC QN AUTO: 22.3 PG (ref 26.1–32.9)
MCHC RBC AUTO-ENTMCNC: 29.8 G/DL (ref 31.4–35)
MCV RBC AUTO: 75 FL (ref 82–102)
METHADONE UR QL: NEGATIVE
MONOCYTES # BLD: 0.78 K/UL (ref 0.1–1.3)
MONOCYTES NFR BLD: 6.9 % (ref 4–12)
NEUTS SEG # BLD: 8.63 K/UL (ref 1.7–8.2)
NEUTS SEG NFR BLD: 76.1 % (ref 43–78)
NRBC # BLD: 0 K/UL (ref 0–0.2)
OPIATES UR QL: POSITIVE
PCP UR QL: NEGATIVE
PH UR: 6.3 (ref 4.5–8)
PLATELET # BLD AUTO: 288 K/UL (ref 150–450)
PMV BLD AUTO: 9.7 FL (ref 9.4–12.3)
POTASSIUM SERPL-SCNC: 4 MMOL/L (ref 3.5–5.1)
PROT SERPL-MCNC: 7.8 G/DL (ref 6.3–8.2)
RBC # BLD AUTO: 5.24 M/UL (ref 4.23–5.6)
SODIUM SERPL-SCNC: 139 MMOL/L (ref 136–145)
TROPONIN T SERPL HS-MCNC: 9.2 NG/L (ref 0–22)
TROPONIN T SERPL HS-MCNC: 9.5 NG/L (ref 0–22)
TROPONIN T SERPL HS-MCNC: <6 NG/L (ref 0–22)
WBC # BLD AUTO: 11.3 K/UL (ref 4.3–11.1)

## 2025-08-14 PROCEDURE — C1894 INTRO/SHEATH, NON-LASER: HCPCS | Performed by: INTERNAL MEDICINE

## 2025-08-14 PROCEDURE — 85025 COMPLETE CBC W/AUTO DIFF WBC: CPT

## 2025-08-14 PROCEDURE — 4A023N7 MEASUREMENT OF CARDIAC SAMPLING AND PRESSURE, LEFT HEART, PERCUTANEOUS APPROACH: ICD-10-PCS | Performed by: INTERNAL MEDICINE

## 2025-08-14 PROCEDURE — 85379 FIBRIN DEGRADATION QUANT: CPT

## 2025-08-14 PROCEDURE — 6370000000 HC RX 637 (ALT 250 FOR IP): Performed by: EMERGENCY MEDICINE

## 2025-08-14 PROCEDURE — 96374 THER/PROPH/DIAG INJ IV PUSH: CPT

## 2025-08-14 PROCEDURE — C1769 GUIDE WIRE: HCPCS | Performed by: INTERNAL MEDICINE

## 2025-08-14 PROCEDURE — 6360000002 HC RX W HCPCS: Performed by: EMERGENCY MEDICINE

## 2025-08-14 PROCEDURE — 84484 ASSAY OF TROPONIN QUANT: CPT

## 2025-08-14 PROCEDURE — 93458 L HRT ARTERY/VENTRICLE ANGIO: CPT | Performed by: INTERNAL MEDICINE

## 2025-08-14 PROCEDURE — 6360000002 HC RX W HCPCS: Performed by: INTERNAL MEDICINE

## 2025-08-14 PROCEDURE — 93010 ELECTROCARDIOGRAM REPORT: CPT | Performed by: INTERNAL MEDICINE

## 2025-08-14 PROCEDURE — 2709999900 HC NON-CHARGEABLE SUPPLY: Performed by: INTERNAL MEDICINE

## 2025-08-14 PROCEDURE — 93306 TTE W/DOPPLER COMPLETE: CPT

## 2025-08-14 PROCEDURE — B2151ZZ FLUOROSCOPY OF LEFT HEART USING LOW OSMOLAR CONTRAST: ICD-10-PCS | Performed by: INTERNAL MEDICINE

## 2025-08-14 PROCEDURE — B2111ZZ FLUOROSCOPY OF MULTIPLE CORONARY ARTERIES USING LOW OSMOLAR CONTRAST: ICD-10-PCS | Performed by: INTERNAL MEDICINE

## 2025-08-14 PROCEDURE — 6360000004 HC RX CONTRAST MEDICATION: Performed by: INTERNAL MEDICINE

## 2025-08-14 PROCEDURE — 99152 MOD SED SAME PHYS/QHP 5/>YRS: CPT | Performed by: INTERNAL MEDICINE

## 2025-08-14 PROCEDURE — 93005 ELECTROCARDIOGRAM TRACING: CPT | Performed by: EMERGENCY MEDICINE

## 2025-08-14 PROCEDURE — 2500000003 HC RX 250 WO HCPCS: Performed by: INTERNAL MEDICINE

## 2025-08-14 PROCEDURE — 93306 TTE W/DOPPLER COMPLETE: CPT | Performed by: INTERNAL MEDICINE

## 2025-08-14 PROCEDURE — 99285 EMERGENCY DEPT VISIT HI MDM: CPT

## 2025-08-14 PROCEDURE — 71045 X-RAY EXAM CHEST 1 VIEW: CPT

## 2025-08-14 PROCEDURE — 96375 TX/PRO/DX INJ NEW DRUG ADDON: CPT

## 2025-08-14 PROCEDURE — 6370000000 HC RX 637 (ALT 250 FOR IP): Performed by: NURSE PRACTITIONER

## 2025-08-14 PROCEDURE — 2100000001 HC CVICU R&B

## 2025-08-14 PROCEDURE — 80053 COMPREHEN METABOLIC PANEL: CPT

## 2025-08-14 PROCEDURE — 36415 COLL VENOUS BLD VENIPUNCTURE: CPT

## 2025-08-14 PROCEDURE — 86141 C-REACTIVE PROTEIN HS: CPT

## 2025-08-14 PROCEDURE — 2500000003 HC RX 250 WO HCPCS: Performed by: NURSE PRACTITIONER

## 2025-08-14 PROCEDURE — 99223 1ST HOSP IP/OBS HIGH 75: CPT | Performed by: INTERNAL MEDICINE

## 2025-08-14 PROCEDURE — 85652 RBC SED RATE AUTOMATED: CPT

## 2025-08-14 RX ORDER — ASPIRIN 81 MG/1
81 TABLET, CHEWABLE ORAL DAILY
Status: DISCONTINUED | OUTPATIENT
Start: 2025-08-14 | End: 2025-08-15 | Stop reason: HOSPADM

## 2025-08-14 RX ORDER — SODIUM CHLORIDE 0.9 % (FLUSH) 0.9 %
5-40 SYRINGE (ML) INJECTION PRN
Status: DISCONTINUED | OUTPATIENT
Start: 2025-08-14 | End: 2025-08-15 | Stop reason: HOSPADM

## 2025-08-14 RX ORDER — POTASSIUM CHLORIDE 1500 MG/1
40 TABLET, EXTENDED RELEASE ORAL PRN
Status: DISCONTINUED | OUTPATIENT
Start: 2025-08-14 | End: 2025-08-15 | Stop reason: HOSPADM

## 2025-08-14 RX ORDER — NITROGLYCERIN 0.4 MG/1
0.4 TABLET SUBLINGUAL EVERY 5 MIN PRN
Status: DISCONTINUED | OUTPATIENT
Start: 2025-08-14 | End: 2025-08-15 | Stop reason: HOSPADM

## 2025-08-14 RX ORDER — MAGNESIUM SULFATE IN WATER 40 MG/ML
2000 INJECTION, SOLUTION INTRAVENOUS PRN
Status: DISCONTINUED | OUTPATIENT
Start: 2025-08-14 | End: 2025-08-15 | Stop reason: HOSPADM

## 2025-08-14 RX ORDER — IOPAMIDOL 755 MG/ML
INJECTION, SOLUTION INTRAVASCULAR PRN
Status: DISCONTINUED | OUTPATIENT
Start: 2025-08-14 | End: 2025-08-14 | Stop reason: HOSPADM

## 2025-08-14 RX ORDER — ACETAMINOPHEN 650 MG/1
650 SUPPOSITORY RECTAL EVERY 6 HOURS PRN
Status: DISCONTINUED | OUTPATIENT
Start: 2025-08-14 | End: 2025-08-15 | Stop reason: HOSPADM

## 2025-08-14 RX ORDER — SODIUM CHLORIDE 9 MG/ML
INJECTION, SOLUTION INTRAVENOUS PRN
Status: DISCONTINUED | OUTPATIENT
Start: 2025-08-14 | End: 2025-08-15 | Stop reason: HOSPADM

## 2025-08-14 RX ORDER — ACETAMINOPHEN 325 MG/1
650 TABLET ORAL EVERY 6 HOURS PRN
Status: DISCONTINUED | OUTPATIENT
Start: 2025-08-14 | End: 2025-08-15 | Stop reason: HOSPADM

## 2025-08-14 RX ORDER — MORPHINE SULFATE 10 MG/ML
5 INJECTION, SOLUTION INTRAMUSCULAR; INTRAVENOUS
Refills: 0 | Status: COMPLETED | OUTPATIENT
Start: 2025-08-14 | End: 2025-08-14

## 2025-08-14 RX ORDER — ASPIRIN 81 MG/1
324 TABLET, CHEWABLE ORAL
Status: COMPLETED | OUTPATIENT
Start: 2025-08-14 | End: 2025-08-14

## 2025-08-14 RX ORDER — HEPARIN SODIUM 1000 [USP'U]/ML
4000 INJECTION, SOLUTION INTRAVENOUS; SUBCUTANEOUS
Status: COMPLETED | OUTPATIENT
Start: 2025-08-14 | End: 2025-08-14

## 2025-08-14 RX ORDER — MAGNESIUM HYDROXIDE/ALUMINUM HYDROXICE/SIMETHICONE 120; 1200; 1200 MG/30ML; MG/30ML; MG/30ML
30 SUSPENSION ORAL EVERY 6 HOURS PRN
Status: DISCONTINUED | OUTPATIENT
Start: 2025-08-14 | End: 2025-08-15 | Stop reason: HOSPADM

## 2025-08-14 RX ORDER — BENZTROPINE MESYLATE 1 MG/1
1 TABLET ORAL
Status: DISCONTINUED | OUTPATIENT
Start: 2025-08-14 | End: 2025-08-15 | Stop reason: HOSPADM

## 2025-08-14 RX ORDER — DIVALPROEX SODIUM 500 MG/1
1000 TABLET, DELAYED RELEASE ORAL 2 TIMES DAILY
Status: DISCONTINUED | OUTPATIENT
Start: 2025-08-14 | End: 2025-08-15 | Stop reason: HOSPADM

## 2025-08-14 RX ORDER — ROSUVASTATIN CALCIUM 20 MG/1
40 TABLET, COATED ORAL NIGHTLY
Status: DISCONTINUED | OUTPATIENT
Start: 2025-08-14 | End: 2025-08-15 | Stop reason: HOSPADM

## 2025-08-14 RX ORDER — POTASSIUM CHLORIDE 7.45 MG/ML
10 INJECTION INTRAVENOUS PRN
Status: DISCONTINUED | OUTPATIENT
Start: 2025-08-14 | End: 2025-08-15 | Stop reason: HOSPADM

## 2025-08-14 RX ORDER — POLYETHYLENE GLYCOL 3350 17 G/17G
17 POWDER, FOR SOLUTION ORAL DAILY PRN
Status: DISCONTINUED | OUTPATIENT
Start: 2025-08-14 | End: 2025-08-15 | Stop reason: HOSPADM

## 2025-08-14 RX ORDER — FERROUS SULFATE 325(65) MG
325 TABLET ORAL
COMMUNITY

## 2025-08-14 RX ORDER — NITROGLYCERIN 0.4 MG/1
0.4 TABLET SUBLINGUAL EVERY 5 MIN PRN
Status: DISCONTINUED | OUTPATIENT
Start: 2025-08-14 | End: 2025-08-14 | Stop reason: HOSPADM

## 2025-08-14 RX ORDER — METOPROLOL TARTRATE 25 MG/1
12.5 TABLET, FILM COATED ORAL 2 TIMES DAILY
Status: DISCONTINUED | OUTPATIENT
Start: 2025-08-14 | End: 2025-08-15 | Stop reason: HOSPADM

## 2025-08-14 RX ORDER — LIDOCAINE HYDROCHLORIDE 10 MG/ML
INJECTION, SOLUTION INFILTRATION; PERINEURAL PRN
Status: DISCONTINUED | OUTPATIENT
Start: 2025-08-14 | End: 2025-08-14 | Stop reason: HOSPADM

## 2025-08-14 RX ORDER — ONDANSETRON 4 MG/1
4 TABLET, ORALLY DISINTEGRATING ORAL EVERY 8 HOURS PRN
Status: DISCONTINUED | OUTPATIENT
Start: 2025-08-14 | End: 2025-08-15 | Stop reason: HOSPADM

## 2025-08-14 RX ORDER — MIDAZOLAM HYDROCHLORIDE 1 MG/ML
INJECTION, SOLUTION INTRAMUSCULAR; INTRAVENOUS PRN
Status: DISCONTINUED | OUTPATIENT
Start: 2025-08-14 | End: 2025-08-14 | Stop reason: HOSPADM

## 2025-08-14 RX ORDER — HEPARIN SODIUM 200 [USP'U]/100ML
INJECTION, SOLUTION INTRAVENOUS CONTINUOUS PRN
Status: COMPLETED | OUTPATIENT
Start: 2025-08-14 | End: 2025-08-14

## 2025-08-14 RX ORDER — OLANZAPINE 5 MG/1
5 TABLET, FILM COATED ORAL NIGHTLY
Status: DISCONTINUED | OUTPATIENT
Start: 2025-08-14 | End: 2025-08-15 | Stop reason: HOSPADM

## 2025-08-14 RX ORDER — SODIUM CHLORIDE 0.9 % (FLUSH) 0.9 %
5-40 SYRINGE (ML) INJECTION EVERY 12 HOURS SCHEDULED
Status: DISCONTINUED | OUTPATIENT
Start: 2025-08-14 | End: 2025-08-15 | Stop reason: HOSPADM

## 2025-08-14 RX ORDER — ONDANSETRON 2 MG/ML
4 INJECTION INTRAMUSCULAR; INTRAVENOUS EVERY 6 HOURS PRN
Status: DISCONTINUED | OUTPATIENT
Start: 2025-08-14 | End: 2025-08-15 | Stop reason: HOSPADM

## 2025-08-14 RX ORDER — ERGOCALCIFEROL 1.25 MG/1
50000 CAPSULE, LIQUID FILLED ORAL
Status: DISCONTINUED | OUTPATIENT
Start: 2025-08-20 | End: 2025-08-15 | Stop reason: HOSPADM

## 2025-08-14 RX ADMIN — HEPARIN SODIUM 4000 UNITS: 1000 INJECTION, SOLUTION INTRAVENOUS; SUBCUTANEOUS at 13:06

## 2025-08-14 RX ADMIN — METOPROLOL TARTRATE 12.5 MG: 25 TABLET, FILM COATED ORAL at 20:38

## 2025-08-14 RX ADMIN — NITROGLYCERIN 0.4 MG: 0.4 TABLET SUBLINGUAL at 13:03

## 2025-08-14 RX ADMIN — ROSUVASTATIN CALCIUM 40 MG: 20 TABLET, FILM COATED ORAL at 20:40

## 2025-08-14 RX ADMIN — MORPHINE SULFATE 5 MG: 10 INJECTION, SOLUTION INTRAMUSCULAR; INTRAVENOUS at 13:16

## 2025-08-14 RX ADMIN — ASPIRIN 324 MG: 81 TABLET, CHEWABLE ORAL at 13:03

## 2025-08-14 RX ADMIN — NITROGLYCERIN 1 INCH: 20 OINTMENT TOPICAL at 13:17

## 2025-08-14 RX ADMIN — BENZTROPINE MESYLATE 1 MG: 1 TABLET ORAL at 20:40

## 2025-08-14 RX ADMIN — DIVALPROEX SODIUM 1000 MG: 500 TABLET, DELAYED RELEASE ORAL at 20:40

## 2025-08-14 RX ADMIN — ASPIRIN 81 MG: 81 TABLET, CHEWABLE ORAL at 17:57

## 2025-08-14 RX ADMIN — OLANZAPINE 5 MG: 5 TABLET, FILM COATED ORAL at 20:40

## 2025-08-14 RX ADMIN — SODIUM CHLORIDE, PRESERVATIVE FREE 10 ML: 5 INJECTION INTRAVENOUS at 19:16

## 2025-08-14 ASSESSMENT — ENCOUNTER SYMPTOMS
SHORTNESS OF BREATH: 1
VOMITING: 1
EYES NEGATIVE: 1
HEARTBURN: 0
NAUSEA: 1

## 2025-08-14 ASSESSMENT — PAIN - FUNCTIONAL ASSESSMENT: PAIN_FUNCTIONAL_ASSESSMENT: 0-10

## 2025-08-14 ASSESSMENT — PAIN DESCRIPTION - PAIN TYPE: TYPE: ACUTE PAIN

## 2025-08-14 ASSESSMENT — PAIN SCALES - GENERAL
PAINLEVEL_OUTOF10: 10
PAINLEVEL_OUTOF10: 8
PAINLEVEL_OUTOF10: 3
PAINLEVEL_OUTOF10: 3
PAINLEVEL_OUTOF10: 10
PAINLEVEL_OUTOF10: 0

## 2025-08-14 ASSESSMENT — LIFESTYLE VARIABLES
HOW MANY STANDARD DRINKS CONTAINING ALCOHOL DO YOU HAVE ON A TYPICAL DAY: PATIENT DOES NOT DRINK
HOW OFTEN DO YOU HAVE A DRINK CONTAINING ALCOHOL: NEVER

## 2025-08-14 ASSESSMENT — PAIN DESCRIPTION - LOCATION: LOCATION: CHEST

## 2025-08-14 ASSESSMENT — PAIN DESCRIPTION - DESCRIPTORS: DESCRIPTORS: TIGHTNESS;SQUEEZING

## 2025-08-15 VITALS
DIASTOLIC BLOOD PRESSURE: 63 MMHG | HEIGHT: 66 IN | TEMPERATURE: 98.5 F | WEIGHT: 138.23 LBS | OXYGEN SATURATION: 94 % | RESPIRATION RATE: 17 BRPM | HEART RATE: 74 BPM | SYSTOLIC BLOOD PRESSURE: 117 MMHG | BODY MASS INDEX: 22.22 KG/M2

## 2025-08-15 LAB
ANION GAP SERPL CALC-SCNC: 11 MMOL/L (ref 7–16)
BUN SERPL-MCNC: 8 MG/DL (ref 6–23)
CALCIUM SERPL-MCNC: 8.6 MG/DL (ref 8.8–10.2)
CHLORIDE SERPL-SCNC: 104 MMOL/L (ref 98–107)
CHOLEST SERPL-MCNC: 125 MG/DL (ref 0–200)
CO2 SERPL-SCNC: 20 MMOL/L (ref 20–29)
CREAT SERPL-MCNC: 0.94 MG/DL (ref 0.8–1.3)
ERYTHROCYTE [DISTWIDTH] IN BLOOD BY AUTOMATED COUNT: 16.5 % (ref 11.9–14.6)
EST. AVERAGE GLUCOSE BLD GHB EST-MCNC: 120 MG/DL
GLUCOSE SERPL-MCNC: 83 MG/DL (ref 70–99)
HBA1C MFR BLD: 5.8 % (ref 0–5.6)
HCT VFR BLD AUTO: 37.7 % (ref 41.1–50.3)
HDLC SERPL-MCNC: 56 MG/DL (ref 40–60)
HDLC SERPL: 2.2 (ref 0–5)
HGB BLD-MCNC: 11.7 G/DL (ref 13.6–17.2)
LDLC SERPL CALC-MCNC: 56 MG/DL (ref 0–100)
MCH RBC QN AUTO: 23.1 PG (ref 26.1–32.9)
MCHC RBC AUTO-ENTMCNC: 31 G/DL (ref 31.4–35)
MCV RBC AUTO: 74.4 FL (ref 82–102)
NRBC # BLD: 0 K/UL (ref 0–0.2)
PLATELET # BLD AUTO: 262 K/UL (ref 150–450)
PMV BLD AUTO: 9.8 FL (ref 9.4–12.3)
POTASSIUM SERPL-SCNC: 3.9 MMOL/L (ref 3.5–5.1)
RBC # BLD AUTO: 5.07 M/UL (ref 4.23–5.6)
SODIUM SERPL-SCNC: 135 MMOL/L (ref 136–145)
TRIGL SERPL-MCNC: 68 MG/DL (ref 0–150)
VLDLC SERPL CALC-MCNC: 14 MG/DL (ref 6–23)
WBC # BLD AUTO: 10.1 K/UL (ref 4.3–11.1)

## 2025-08-15 PROCEDURE — 36415 COLL VENOUS BLD VENIPUNCTURE: CPT

## 2025-08-15 PROCEDURE — 6360000002 HC RX W HCPCS: Performed by: NURSE PRACTITIONER

## 2025-08-15 PROCEDURE — 99238 HOSP IP/OBS DSCHRG MGMT 30/<: CPT | Performed by: INTERNAL MEDICINE

## 2025-08-15 PROCEDURE — 2500000003 HC RX 250 WO HCPCS: Performed by: NURSE PRACTITIONER

## 2025-08-15 PROCEDURE — 85027 COMPLETE CBC AUTOMATED: CPT

## 2025-08-15 PROCEDURE — 80048 BASIC METABOLIC PNL TOTAL CA: CPT

## 2025-08-15 PROCEDURE — 6370000000 HC RX 637 (ALT 250 FOR IP): Performed by: NURSE PRACTITIONER

## 2025-08-15 PROCEDURE — 83036 HEMOGLOBIN GLYCOSYLATED A1C: CPT

## 2025-08-15 PROCEDURE — 80061 LIPID PANEL: CPT

## 2025-08-15 RX ORDER — KETOROLAC TROMETHAMINE 15 MG/ML
15 INJECTION, SOLUTION INTRAMUSCULAR; INTRAVENOUS ONCE
Status: COMPLETED | OUTPATIENT
Start: 2025-08-15 | End: 2025-08-15

## 2025-08-15 RX ADMIN — METOPROLOL TARTRATE 12.5 MG: 25 TABLET, FILM COATED ORAL at 08:13

## 2025-08-15 RX ADMIN — KETOROLAC TROMETHAMINE 15 MG: 15 INJECTION, SOLUTION INTRAMUSCULAR; INTRAVENOUS at 00:54

## 2025-08-15 RX ADMIN — SODIUM CHLORIDE, PRESERVATIVE FREE 10 ML: 5 INJECTION INTRAVENOUS at 08:17

## 2025-08-15 RX ADMIN — DIVALPROEX SODIUM 1000 MG: 500 TABLET, DELAYED RELEASE ORAL at 08:30

## 2025-08-15 RX ADMIN — ASPIRIN 81 MG: 81 TABLET, CHEWABLE ORAL at 08:14

## 2025-08-15 ASSESSMENT — PAIN SCALES - GENERAL
PAINLEVEL_OUTOF10: 0
PAINLEVEL_OUTOF10: 0

## 2025-08-21 ENCOUNTER — HOSPITAL ENCOUNTER (EMERGENCY)
Age: 35
Discharge: HOME | End: 2025-08-21
Attending: STUDENT IN AN ORGANIZED HEALTH CARE EDUCATION/TRAINING PROGRAM
Payer: MEDICARE

## 2025-08-21 VITALS
SYSTOLIC BLOOD PRESSURE: 121 MMHG | TEMPERATURE: 97.8 F | HEIGHT: 66 IN | DIASTOLIC BLOOD PRESSURE: 65 MMHG | HEART RATE: 55 BPM | OXYGEN SATURATION: 99 % | BODY MASS INDEX: 22.18 KG/M2 | WEIGHT: 138 LBS | RESPIRATION RATE: 13 BRPM

## 2025-08-21 DIAGNOSIS — R56.9 SEIZURE (HCC): Primary | ICD-10-CM

## 2025-08-21 LAB
ANION GAP SERPL CALC-SCNC: 15 MMOL/L (ref 7–16)
BASOPHILS # BLD: 0.02 K/UL (ref 0–0.2)
BASOPHILS NFR BLD: 0.4 % (ref 0–2)
BUN SERPL-MCNC: 10 MG/DL (ref 6–23)
CALCIUM SERPL-MCNC: 8.4 MG/DL (ref 8.8–10.2)
CHLORIDE SERPL-SCNC: 102 MMOL/L (ref 98–107)
CO2 SERPL-SCNC: 23 MMOL/L (ref 20–29)
CREAT SERPL-MCNC: 1.02 MG/DL (ref 0.8–1.3)
DIFFERENTIAL METHOD BLD: ABNORMAL
EKG ATRIAL RATE: 85 BPM
EKG DIAGNOSIS: NORMAL
EKG P AXIS: 72 DEGREES
EKG P-R INTERVAL: 140 MS
EKG Q-T INTERVAL: 378 MS
EKG QRS DURATION: 91 MS
EKG QTC CALCULATION (BAZETT): 445 MS
EKG R AXIS: 67 DEGREES
EKG T AXIS: 19 DEGREES
EKG VENTRICULAR RATE: 83 BPM
EOSINOPHIL # BLD: 0.11 K/UL (ref 0–0.8)
EOSINOPHIL NFR BLD: 2 % (ref 0.5–7.8)
ERYTHROCYTE [DISTWIDTH] IN BLOOD BY AUTOMATED COUNT: 16.9 % (ref 11.9–14.6)
ETHANOL SERPL-MCNC: 21 MG/DL (ref 0–0.08)
GLUCOSE SERPL-MCNC: 87 MG/DL (ref 70–99)
HCT VFR BLD AUTO: 33 % (ref 41.1–50.3)
HGB BLD-MCNC: 10.4 G/DL (ref 13.6–17.2)
IMM GRANULOCYTES # BLD AUTO: 0.01 K/UL (ref 0–0.5)
IMM GRANULOCYTES NFR BLD AUTO: 0.2 % (ref 0–5)
LYMPHOCYTES # BLD: 2.71 K/UL (ref 0.5–4.6)
LYMPHOCYTES NFR BLD: 49.3 % (ref 13–44)
MCH RBC QN AUTO: 23.5 PG (ref 26.1–32.9)
MCHC RBC AUTO-ENTMCNC: 31.5 G/DL (ref 31.4–35)
MCV RBC AUTO: 74.5 FL (ref 82–102)
MONOCYTES # BLD: 0.45 K/UL (ref 0.1–1.3)
MONOCYTES NFR BLD: 8.2 % (ref 4–12)
NEUTS SEG # BLD: 2.2 K/UL (ref 1.7–8.2)
NEUTS SEG NFR BLD: 39.9 % (ref 43–78)
NRBC # BLD: 0 K/UL (ref 0–0.2)
PLATELET # BLD AUTO: 236 K/UL (ref 150–450)
PMV BLD AUTO: 10 FL (ref 9.4–12.3)
POTASSIUM SERPL-SCNC: 3.4 MMOL/L (ref 3.5–5.1)
RBC # BLD AUTO: 4.43 M/UL (ref 4.23–5.6)
SODIUM SERPL-SCNC: 139 MMOL/L (ref 136–145)
WBC # BLD AUTO: 5.5 K/UL (ref 4.3–11.1)

## 2025-08-21 PROCEDURE — 80048 BASIC METABOLIC PNL TOTAL CA: CPT

## 2025-08-21 PROCEDURE — 85025 COMPLETE CBC W/AUTO DIFF WBC: CPT

## 2025-08-21 PROCEDURE — 82077 ASSAY SPEC XCP UR&BREATH IA: CPT

## 2025-08-21 PROCEDURE — 2580000003 HC RX 258: Performed by: STUDENT IN AN ORGANIZED HEALTH CARE EDUCATION/TRAINING PROGRAM

## 2025-08-21 PROCEDURE — 96374 THER/PROPH/DIAG INJ IV PUSH: CPT

## 2025-08-21 PROCEDURE — 99284 EMERGENCY DEPT VISIT MOD MDM: CPT

## 2025-08-21 PROCEDURE — 6360000002 HC RX W HCPCS: Performed by: STUDENT IN AN ORGANIZED HEALTH CARE EDUCATION/TRAINING PROGRAM

## 2025-08-21 RX ORDER — 0.9 % SODIUM CHLORIDE 0.9 %
1000 INTRAVENOUS SOLUTION INTRAVENOUS ONCE
Status: COMPLETED | OUTPATIENT
Start: 2025-08-21 | End: 2025-08-21

## 2025-08-21 RX ORDER — DIVALPROEX SODIUM 500 MG/1
1000 TABLET, DELAYED RELEASE ORAL 2 TIMES DAILY
Qty: 120 TABLET | Refills: 0 | Status: SHIPPED | OUTPATIENT
Start: 2025-08-21

## 2025-08-21 RX ORDER — LEVETIRACETAM 500 MG/5ML
1000 INJECTION, SOLUTION, CONCENTRATE INTRAVENOUS
Status: COMPLETED | OUTPATIENT
Start: 2025-08-21 | End: 2025-08-21

## 2025-08-21 RX ADMIN — LEVETIRACETAM 1000 MG: 100 INJECTION INTRAVENOUS at 03:27

## 2025-08-21 RX ADMIN — SODIUM CHLORIDE 1000 ML: 0.9 INJECTION, SOLUTION INTRAVENOUS at 04:07

## 2025-08-21 ASSESSMENT — PAIN SCALES - GENERAL: PAINLEVEL_OUTOF10: 0

## 2025-08-21 ASSESSMENT — PAIN - FUNCTIONAL ASSESSMENT: PAIN_FUNCTIONAL_ASSESSMENT: 0-10

## (undated) DEVICE — CATHETER VASC 6 FR DIAG PGTL W/ SIDE H COR 110 CM LEN W/OUT

## (undated) DEVICE — BAND COMPR L24CM REG CLR PLAS HEMSTAT EXT HK AND LOOP RETEN

## (undated) DEVICE — GUIDEWIRE VASC L260CM DIA0.035IN RAD 3MM J TIP L7CM PTFE

## (undated) DEVICE — CATHETER DIAG SM AD 6FR L100CM 0.038IN COR POLYUR JUDKINS L

## (undated) DEVICE — KIT INTRO 6FR L10CM MINI WIRE L45CM DIA0.021IN NDL 21GA ANT

## (undated) DEVICE — CATHETER DIAG AD 6FR L100CM 0.038IN COR POLYUR JUDKINS R 5